# Patient Record
Sex: FEMALE | Race: WHITE | Employment: UNEMPLOYED | URBAN - METROPOLITAN AREA
[De-identification: names, ages, dates, MRNs, and addresses within clinical notes are randomized per-mention and may not be internally consistent; named-entity substitution may affect disease eponyms.]

---

## 2017-04-04 ENCOUNTER — HOSPITAL ENCOUNTER (EMERGENCY)
Age: 27
Discharge: HOME | End: 2017-04-04
Attending: EMERGENCY MEDICINE
Payer: COMMERCIAL

## 2017-04-04 VITALS
HEIGHT: 60 IN | DIASTOLIC BLOOD PRESSURE: 70 MMHG | RESPIRATION RATE: 20 BRPM | BODY MASS INDEX: 29.45 KG/M2 | WEIGHT: 150 LBS | OXYGEN SATURATION: 97 % | TEMPERATURE: 97.9 F | SYSTOLIC BLOOD PRESSURE: 125 MMHG | HEART RATE: 89 BPM

## 2017-04-04 DIAGNOSIS — J40 BRONCHITIS: Primary | ICD-10-CM

## 2017-04-04 DIAGNOSIS — K04.7 DENTAL INFECTION: ICD-10-CM

## 2017-04-04 LAB — HCG(URINE) PREGNANCY TEST: NEGATIVE

## 2017-04-04 PROCEDURE — 84703 CHORIONIC GONADOTROPIN ASSAY: CPT

## 2017-04-04 PROCEDURE — 99283 EMERGENCY DEPT VISIT LOW MDM: CPT

## 2017-04-04 RX ORDER — IBUPROFEN 200 MG
1600 TABLET ORAL EVERY 6 HOURS PRN
Status: ON HOLD | COMMUNITY
End: 2018-01-28 | Stop reason: HOSPADM

## 2017-04-04 RX ORDER — HYDROCODONE BITARTRATE AND ACETAMINOPHEN 5; 325 MG/1; MG/1
1 TABLET ORAL EVERY 6 HOURS PRN
Qty: 16 TABLET | Refills: 0 | Status: ON HOLD | OUTPATIENT
Start: 2017-04-04 | End: 2018-01-28 | Stop reason: HOSPADM

## 2017-04-04 RX ORDER — CLINDAMYCIN HYDROCHLORIDE 300 MG/1
300 CAPSULE ORAL 3 TIMES DAILY
Qty: 30 CAPSULE | Refills: 0 | Status: SHIPPED | OUTPATIENT
Start: 2017-04-04 | End: 2017-04-14

## 2017-04-04 RX ORDER — PREDNISONE 10 MG/1
TABLET ORAL
Qty: 18 TABLET | Refills: 0 | Status: SHIPPED | OUTPATIENT
Start: 2017-04-04 | End: 2017-04-14

## 2017-04-04 RX ORDER — GUAIFENESIN 100 MG/5ML
200 SYRUP ORAL 3 TIMES DAILY PRN
Status: ON HOLD | COMMUNITY
End: 2018-01-28 | Stop reason: HOSPADM

## 2017-04-04 RX ORDER — ALBUTEROL SULFATE 90 UG/1
AEROSOL, METERED RESPIRATORY (INHALATION)
Qty: 1 INHALER | Refills: 1 | Status: SHIPPED | OUTPATIENT
Start: 2017-04-04

## 2017-04-04 ASSESSMENT — PAIN DESCRIPTION - INTENSITY
RATING_2: 8
RATING_3: 8

## 2017-04-04 ASSESSMENT — ENCOUNTER SYMPTOMS
VOMITING: 0
SINUS PRESSURE: 1
ABDOMINAL PAIN: 0
RHINORRHEA: 1
FACIAL SWELLING: 1
NAUSEA: 0
SHORTNESS OF BREATH: 1
WHEEZING: 1
STRIDOR: 0
EYES NEGATIVE: 1
COUGH: 1
TROUBLE SWALLOWING: 0
ALLERGIC/IMMUNOLOGIC NEGATIVE: 1

## 2017-04-04 ASSESSMENT — PAIN DESCRIPTION - ONSET: ONSET: AWAKENED FROM SLEEP

## 2017-04-04 ASSESSMENT — PAIN DESCRIPTION - ORIENTATION: ORIENTATION_3: LOWER;RIGHT

## 2017-04-04 ASSESSMENT — PAIN DESCRIPTION - DESCRIPTORS
DESCRIPTORS: BURNING
DESCRIPTORS_2: BURNING
DESCRIPTORS_3: ACHING;SORE;SQUEEZING

## 2017-04-04 ASSESSMENT — PAIN DESCRIPTION - LOCATION
LOCATION_3: TEETH
LOCATION_2: THROAT
LOCATION: CHEST

## 2017-04-04 ASSESSMENT — PAIN DESCRIPTION - PAIN TYPE: TYPE: ACUTE PAIN

## 2017-04-04 ASSESSMENT — PAIN SCALES - GENERAL: PAINLEVEL_OUTOF10: 6

## 2017-12-14 PROBLEM — K91.2 POSTSURGICAL NONABSORPTION: Status: ACTIVE | Noted: 2017-12-14

## 2017-12-14 PROBLEM — D50.8 IRON DEFICIENCY ANEMIA SECONDARY TO INADEQUATE DIETARY IRON INTAKE: Status: ACTIVE | Noted: 2017-12-14

## 2018-01-25 PROBLEM — O34.219 PREVIOUS CESAREAN SECTION COMPLICATING PREGNANCY: Status: ACTIVE | Noted: 2018-01-25

## 2018-01-28 PROBLEM — Z98.891 S/P CESAREAN SECTION: Status: ACTIVE | Noted: 2018-01-28

## 2018-04-21 ENCOUNTER — HOSPITAL ENCOUNTER (EMERGENCY)
Dept: HOSPITAL 100 - ED | Age: 28
Discharge: HOME | End: 2018-04-21
Payer: MEDICAID

## 2018-04-21 VITALS — RESPIRATION RATE: 18 BRPM | OXYGEN SATURATION: 98 %

## 2018-04-21 VITALS
DIASTOLIC BLOOD PRESSURE: 71 MMHG | OXYGEN SATURATION: 98 % | HEART RATE: 115 BPM | TEMPERATURE: 98.42 F | RESPIRATION RATE: 16 BRPM | SYSTOLIC BLOOD PRESSURE: 125 MMHG

## 2018-04-21 VITALS — BODY MASS INDEX: 32.2 KG/M2

## 2018-04-21 DIAGNOSIS — R59.0: ICD-10-CM

## 2018-04-21 DIAGNOSIS — J02.9: Primary | ICD-10-CM

## 2018-04-21 PROCEDURE — 99283 EMERGENCY DEPT VISIT LOW MDM: CPT

## 2019-03-07 ENCOUNTER — HOSPITAL ENCOUNTER (EMERGENCY)
Age: 29
Discharge: HOME | End: 2019-03-07
Payer: MEDICAID

## 2019-03-07 VITALS
TEMPERATURE: 99.1 F | OXYGEN SATURATION: 98 % | SYSTOLIC BLOOD PRESSURE: 112 MMHG | DIASTOLIC BLOOD PRESSURE: 70 MMHG | RESPIRATION RATE: 18 BRPM | HEART RATE: 98 BPM

## 2019-03-07 VITALS — BODY MASS INDEX: 33.9 KG/M2

## 2019-03-07 DIAGNOSIS — L03.116: ICD-10-CM

## 2019-03-07 DIAGNOSIS — L02.02: Primary | ICD-10-CM

## 2019-03-07 DIAGNOSIS — Z72.0: ICD-10-CM

## 2019-03-07 PROCEDURE — 99283 EMERGENCY DEPT VISIT LOW MDM: CPT

## 2019-03-09 ENCOUNTER — HOSPITAL ENCOUNTER (EMERGENCY)
Age: 29
Discharge: HOME | End: 2019-03-09
Payer: MEDICAID

## 2019-03-09 VITALS
HEART RATE: 103 BPM | TEMPERATURE: 97.5 F | OXYGEN SATURATION: 99 % | RESPIRATION RATE: 15 BRPM | DIASTOLIC BLOOD PRESSURE: 80 MMHG | SYSTOLIC BLOOD PRESSURE: 131 MMHG

## 2019-03-09 VITALS — BODY MASS INDEX: 33.9 KG/M2 | BODY MASS INDEX: 34.4 KG/M2

## 2019-03-09 DIAGNOSIS — L03.116: ICD-10-CM

## 2019-03-09 DIAGNOSIS — Z72.0: ICD-10-CM

## 2019-03-09 DIAGNOSIS — Z79.2: ICD-10-CM

## 2019-03-09 DIAGNOSIS — L02.416: Primary | ICD-10-CM

## 2019-03-09 DIAGNOSIS — L02.01: ICD-10-CM

## 2019-03-09 PROCEDURE — 99282 EMERGENCY DEPT VISIT SF MDM: CPT

## 2019-03-09 PROCEDURE — 10060 I&D ABSCESS SIMPLE/SINGLE: CPT

## 2019-10-05 ENCOUNTER — HOSPITAL ENCOUNTER (INPATIENT)
Dept: HOSPITAL 100 - ED | Age: 29
LOS: 5 days | Discharge: HOME | DRG: 383 | End: 2019-10-10
Payer: MEDICAID

## 2019-10-05 VITALS
TEMPERATURE: 98.42 F | HEART RATE: 88 BPM | SYSTOLIC BLOOD PRESSURE: 104 MMHG | RESPIRATION RATE: 16 BRPM | DIASTOLIC BLOOD PRESSURE: 72 MMHG | OXYGEN SATURATION: 92 %

## 2019-10-05 VITALS
SYSTOLIC BLOOD PRESSURE: 110 MMHG | DIASTOLIC BLOOD PRESSURE: 75 MMHG | OXYGEN SATURATION: 100 % | TEMPERATURE: 97.8 F | HEART RATE: 80 BPM | RESPIRATION RATE: 18 BRPM

## 2019-10-05 VITALS
RESPIRATION RATE: 20 BRPM | HEART RATE: 100 BPM | TEMPERATURE: 99 F | DIASTOLIC BLOOD PRESSURE: 77 MMHG | OXYGEN SATURATION: 100 % | SYSTOLIC BLOOD PRESSURE: 131 MMHG

## 2019-10-05 VITALS
RESPIRATION RATE: 16 BRPM | OXYGEN SATURATION: 98 % | TEMPERATURE: 98.42 F | SYSTOLIC BLOOD PRESSURE: 95 MMHG | HEART RATE: 88 BPM | DIASTOLIC BLOOD PRESSURE: 64 MMHG

## 2019-10-05 VITALS — BODY MASS INDEX: 32.2 KG/M2 | BODY MASS INDEX: 34.4 KG/M2 | BODY MASS INDEX: 33.3 KG/M2

## 2019-10-05 DIAGNOSIS — B19.20: ICD-10-CM

## 2019-10-05 DIAGNOSIS — F15.10: ICD-10-CM

## 2019-10-05 DIAGNOSIS — L02.611: Primary | ICD-10-CM

## 2019-10-05 DIAGNOSIS — L03.115: ICD-10-CM

## 2019-10-05 DIAGNOSIS — F17.210: ICD-10-CM

## 2019-10-05 LAB
AMPHET UR-MCNC: POSITIVE NG/ML
ANION GAP: 6 (ref 5–15)
B-HCG SERPL QL: NEGATIVE NEGATIVE
BARBITURATE URINE VISTA: NEGATIVE
BENZODIAZEPINE URINE VISTA: NEGATIVE
BUN SERPL-MCNC: 13 MG/DL (ref 7–18)
BUN/CREAT RATIO: 23.7 RATIO (ref 10–20)
CALCIUM SERPL-MCNC: 8.3 MG/DL (ref 8.5–10.1)
CARBON DIOXIDE: 28 MMOL/L (ref 21–32)
CHLORIDE: 107 MMOL/L (ref 98–107)
COCAINE URINE VISTA: NEGATIVE
DEPRECATED RDW RBC: 41.9 FL (ref 35.1–43.9)
DRUG CONFIRMATION TO FOLLOW?: (no result)
ECSTACY URINE VISTA: POSITIVE
ERYTHROCYTE [DISTWIDTH] IN BLOOD: 13 % (ref 11.6–14.6)
EST GLOM FILT RATE - AFR AMER: 169 ML/MIN (ref 60–?)
ESTIMATED CREATININE CLEARANCE: 109.38 ML/MIN
GLUCOSE: 88 MG/DL (ref 74–106)
HCG SERPL QL: NEGATIVE NEGATIVE
HCT VFR BLD AUTO: 33.6 % (ref 37–47)
HEMOGLOBIN: 10.9 G/DL (ref 12–15)
HGB BLD-MCNC: 10.9 G/DL (ref 12–15)
IMMATURE GRANULOCYTES COUNT: 0.06 X10^3/UL (ref 0–0)
INTERNAL QC VALIDATED?: (no result)
MCV RBC: 88.9 FL (ref 81–99)
MEAN CORP HGB CONC: 32.4 G/DL (ref 32–36)
MEAN PLATELET VOL.: 9.8 FL (ref 6.2–12)
METHADONE URINE VISTA: NEGATIVE
NRBC FLAGGED BY ANALYZER: 0 % (ref 0–5)
PCP UR QL: NEGATIVE
PH UR: 6 [PH]
PLATELET # BLD: 218 K/MM3 (ref 150–450)
PLATELET COUNT: 218 K/MM3 (ref 150–450)
POTASSIUM: 3.3 MMOL/L (ref 3.5–5.1)
PROT UR QL STRIP.AUTO: 15 MG/DL
RBC # BLD AUTO: 3.78 M/MM3 (ref 4.2–5.4)
RBC DISTRIBUTION WIDTH CV: 13 % (ref 11.6–14.6)
RBC DISTRIBUTION WIDTH SD: 41.9 FL (ref 35.1–43.9)
RBC UR QL: 250 /UL
SP GR UR: 1.01 (ref 1–1.03)
THC URINE VISTA: NEGATIVE
URATE SERPL-MCNC: 2.5 MG/DL (ref 2.6–6)
URINE PRESERVATIVE: (no result)
WBC # BLD AUTO: 12.8 K/MM3 (ref 4.4–11)
WHITE BLOOD COUNT: 12.8 K/MM3 (ref 4.4–11)

## 2019-10-05 PROCEDURE — 73718 MRI LOWER EXTREMITY W/O DYE: CPT

## 2019-10-05 PROCEDURE — 99406 BEHAV CHNG SMOKING 3-10 MIN: CPT

## 2019-10-05 PROCEDURE — 84703 CHORIONIC GONADOTROPIN ASSAY: CPT

## 2019-10-05 PROCEDURE — 97162 PT EVAL MOD COMPLEX 30 MIN: CPT

## 2019-10-05 PROCEDURE — 80048 BASIC METABOLIC PNL TOTAL CA: CPT

## 2019-10-05 PROCEDURE — 73630 X-RAY EXAM OF FOOT: CPT

## 2019-10-05 PROCEDURE — 84550 ASSAY OF BLOOD/URIC ACID: CPT

## 2019-10-05 PROCEDURE — 87070 CULTURE OTHR SPECIMN AEROBIC: CPT

## 2019-10-05 PROCEDURE — 87075 CULTR BACTERIA EXCEPT BLOOD: CPT

## 2019-10-05 PROCEDURE — 87340 HEPATITIS B SURFACE AG IA: CPT

## 2019-10-05 PROCEDURE — 99285 EMERGENCY DEPT VISIT HI MDM: CPT

## 2019-10-05 PROCEDURE — 85025 COMPLETE CBC W/AUTO DIFF WBC: CPT

## 2019-10-05 PROCEDURE — A4216 STERILE WATER/SALINE, 10 ML: HCPCS

## 2019-10-05 PROCEDURE — 86704 HEP B CORE ANTIBODY TOTAL: CPT

## 2019-10-05 PROCEDURE — 87077 CULTURE AEROBIC IDENTIFY: CPT

## 2019-10-05 PROCEDURE — 86703 HIV-1/HIV-2 1 RESULT ANTBDY: CPT

## 2019-10-05 PROCEDURE — 80307 DRUG TEST PRSMV CHEM ANLYZR: CPT

## 2019-10-05 PROCEDURE — 80202 ASSAY OF VANCOMYCIN: CPT

## 2019-10-05 PROCEDURE — 81002 URINALYSIS NONAUTO W/O SCOPE: CPT

## 2019-10-05 PROCEDURE — 36415 COLL VENOUS BLD VENIPUNCTURE: CPT

## 2019-10-05 PROCEDURE — 87205 SMEAR GRAM STAIN: CPT

## 2019-10-05 PROCEDURE — 87640 STAPH A DNA AMP PROBE: CPT

## 2019-10-05 PROCEDURE — 87522 HEPATITIS C REVRS TRNSCRPJ: CPT

## 2019-10-05 PROCEDURE — 83605 ASSAY OF LACTIC ACID: CPT

## 2019-10-05 PROCEDURE — 83735 ASSAY OF MAGNESIUM: CPT

## 2019-10-05 PROCEDURE — 87186 SC STD MICRODIL/AGAR DIL: CPT

## 2019-10-05 RX ADMIN — CEFAZOLIN SODIUM 150 GM: 1 INJECTION, SOLUTION INTRAVENOUS at 22:01

## 2019-10-05 RX ADMIN — SODIUM CHLORIDE 1000 ML: 9 INJECTION, SOLUTION INTRAVENOUS at 10:22

## 2019-10-05 RX ADMIN — CEFAZOLIN SODIUM 150 GM: 1 INJECTION, SOLUTION INTRAVENOUS at 13:59

## 2019-10-05 RX ADMIN — SODIUM CHLORIDE 15 ML: 9 INJECTION, SOLUTION INTRAVENOUS at 14:00

## 2019-10-06 VITALS
SYSTOLIC BLOOD PRESSURE: 121 MMHG | DIASTOLIC BLOOD PRESSURE: 76 MMHG | OXYGEN SATURATION: 100 % | RESPIRATION RATE: 18 BRPM | TEMPERATURE: 98.24 F | HEART RATE: 82 BPM

## 2019-10-06 VITALS
SYSTOLIC BLOOD PRESSURE: 122 MMHG | RESPIRATION RATE: 16 BRPM | HEART RATE: 89 BPM | DIASTOLIC BLOOD PRESSURE: 75 MMHG | TEMPERATURE: 98.5 F | OXYGEN SATURATION: 100 %

## 2019-10-06 VITALS
OXYGEN SATURATION: 99 % | HEART RATE: 81 BPM | DIASTOLIC BLOOD PRESSURE: 62 MMHG | TEMPERATURE: 98.06 F | RESPIRATION RATE: 18 BRPM | SYSTOLIC BLOOD PRESSURE: 108 MMHG

## 2019-10-06 VITALS
DIASTOLIC BLOOD PRESSURE: 69 MMHG | SYSTOLIC BLOOD PRESSURE: 110 MMHG | TEMPERATURE: 97.52 F | HEART RATE: 85 BPM | OXYGEN SATURATION: 100 % | RESPIRATION RATE: 18 BRPM

## 2019-10-06 LAB
ANION GAP: 7 (ref 5–15)
BUN SERPL-MCNC: 9 MG/DL (ref 7–18)
BUN/CREAT RATIO: 19.6 RATIO (ref 10–20)
CALCIUM SERPL-MCNC: 8.1 MG/DL (ref 8.5–10.1)
CARBON DIOXIDE: 26 MMOL/L (ref 21–32)
CHLORIDE: 110 MMOL/L (ref 98–107)
DEPRECATED RDW RBC: 39.8 FL (ref 35.1–43.9)
DEPRECATED RDW RBC: 40.9 FL (ref 35.1–43.9)
ERYTHROCYTE [DISTWIDTH] IN BLOOD: 12.4 % (ref 11.6–14.6)
ERYTHROCYTE [DISTWIDTH] IN BLOOD: 12.8 % (ref 11.6–14.6)
EST GLOM FILT RATE - AFR AMER: 208 ML/MIN (ref 60–?)
ESTIMATED CREATININE CLEARANCE: 130.79 ML/MIN
GLUCOSE: 89 MG/DL (ref 74–106)
HCT VFR BLD AUTO: 32 % (ref 37–47)
HCT VFR BLD AUTO: 34.1 % (ref 37–47)
HEMOGLOBIN: 10.3 G/DL (ref 12–15)
HEMOGLOBIN: 10.9 G/DL (ref 12–15)
HGB BLD-MCNC: 10.3 G/DL (ref 12–15)
HGB BLD-MCNC: 10.9 G/DL (ref 12–15)
IMMATURE GRANULOCYTES COUNT: 0.06 X10^3/UL (ref 0–0)
IMMATURE GRANULOCYTES COUNT: 0.5 X10^3/UL (ref 0–0)
MCV RBC: 86.7 FL (ref 81–99)
MCV RBC: 87.9 FL (ref 81–99)
MEAN CORP HGB CONC: 32 G/DL (ref 32–36)
MEAN CORP HGB CONC: 32.2 G/DL (ref 32–36)
MEAN PLATELET VOL.: 10 FL (ref 6.2–12)
MEAN PLATELET VOL.: 10.8 FL (ref 6.2–12)
NRBC FLAGGED BY ANALYZER: 0 % (ref 0–5)
NRBC FLAGGED BY ANALYZER: 0 % (ref 0–5)
PLATELET # BLD: 230 K/MM3 (ref 150–450)
PLATELET # BLD: 267 K/MM3 (ref 150–450)
PLATELET COUNT: 230 K/MM3 (ref 150–450)
PLATELET COUNT: 267 K/MM3 (ref 150–450)
POTASSIUM: 3.7 MMOL/L (ref 3.5–5.1)
RBC # BLD AUTO: 3.69 M/MM3 (ref 4.2–5.4)
RBC # BLD AUTO: 3.88 M/MM3 (ref 4.2–5.4)
RBC DISTRIBUTION WIDTH CV: 12.4 % (ref 11.6–14.6)
RBC DISTRIBUTION WIDTH CV: 12.8 % (ref 11.6–14.6)
RBC DISTRIBUTION WIDTH SD: 39.8 FL (ref 35.1–43.9)
RBC DISTRIBUTION WIDTH SD: 40.9 FL (ref 35.1–43.9)
WBC # BLD AUTO: 10.4 K/MM3 (ref 4.4–11)
WBC # BLD AUTO: 9.7 K/MM3 (ref 4.4–11)
WHITE BLOOD COUNT: 10.4 K/MM3 (ref 4.4–11)
WHITE BLOOD COUNT: 9.7 K/MM3 (ref 4.4–11)

## 2019-10-06 RX ADMIN — CEFAZOLIN SODIUM 150 GM: 1 INJECTION, SOLUTION INTRAVENOUS at 13:43

## 2019-10-06 RX ADMIN — SODIUM CHLORIDE, PRESERVATIVE FREE 0 ML: 5 INJECTION INTRAVENOUS at 17:21

## 2019-10-06 RX ADMIN — CEFAZOLIN SODIUM 150 GM: 1 INJECTION, SOLUTION INTRAVENOUS at 06:37

## 2019-10-06 RX ADMIN — SODIUM CHLORIDE, PRESERVATIVE FREE 0 ML: 5 INJECTION INTRAVENOUS at 06:37

## 2019-10-07 VITALS
HEART RATE: 75 BPM | SYSTOLIC BLOOD PRESSURE: 122 MMHG | RESPIRATION RATE: 18 BRPM | OXYGEN SATURATION: 100 % | TEMPERATURE: 98 F | DIASTOLIC BLOOD PRESSURE: 79 MMHG

## 2019-10-07 VITALS
DIASTOLIC BLOOD PRESSURE: 74 MMHG | RESPIRATION RATE: 16 BRPM | TEMPERATURE: 98.06 F | HEART RATE: 89 BPM | SYSTOLIC BLOOD PRESSURE: 125 MMHG | OXYGEN SATURATION: 100 %

## 2019-10-07 VITALS
RESPIRATION RATE: 16 BRPM | DIASTOLIC BLOOD PRESSURE: 67 MMHG | TEMPERATURE: 98.6 F | SYSTOLIC BLOOD PRESSURE: 127 MMHG | HEART RATE: 77 BPM | OXYGEN SATURATION: 96 %

## 2019-10-07 VITALS
SYSTOLIC BLOOD PRESSURE: 112 MMHG | TEMPERATURE: 97.8 F | DIASTOLIC BLOOD PRESSURE: 69 MMHG | OXYGEN SATURATION: 99 % | RESPIRATION RATE: 16 BRPM | HEART RATE: 87 BPM

## 2019-10-07 RX ADMIN — SODIUM CHLORIDE, PRESERVATIVE FREE 0 ML: 5 INJECTION INTRAVENOUS at 21:26

## 2019-10-07 RX ADMIN — CEFAZOLIN SODIUM 100 GM: 1 INJECTION, SOLUTION INTRAVENOUS at 09:17

## 2019-10-07 RX ADMIN — SODIUM CHLORIDE, PRESERVATIVE FREE 0 ML: 5 INJECTION INTRAVENOUS at 09:17

## 2019-10-07 RX ADMIN — SODIUM CHLORIDE, PRESERVATIVE FREE 0 ML: 5 INJECTION INTRAVENOUS at 14:54

## 2019-10-07 RX ADMIN — SODIUM CHLORIDE, PRESERVATIVE FREE 0 ML: 5 INJECTION INTRAVENOUS at 06:00

## 2019-10-07 RX ADMIN — CEFAZOLIN SODIUM 100 GM: 1 INJECTION, SOLUTION INTRAVENOUS at 21:25

## 2019-10-07 RX ADMIN — SODIUM CHLORIDE, PRESERVATIVE FREE 0 ML: 5 INJECTION INTRAVENOUS at 23:00

## 2019-10-07 RX ADMIN — SODIUM CHLORIDE, PRESERVATIVE FREE 0 ML: 5 INJECTION INTRAVENOUS at 00:06

## 2019-10-07 RX ADMIN — CEFAZOLIN SODIUM 100 GM: 1 INJECTION, SOLUTION INTRAVENOUS at 14:54

## 2019-10-08 VITALS
OXYGEN SATURATION: 100 % | SYSTOLIC BLOOD PRESSURE: 122 MMHG | TEMPERATURE: 97.8 F | HEART RATE: 83 BPM | RESPIRATION RATE: 18 BRPM | DIASTOLIC BLOOD PRESSURE: 87 MMHG

## 2019-10-08 VITALS
SYSTOLIC BLOOD PRESSURE: 128 MMHG | OXYGEN SATURATION: 100 % | RESPIRATION RATE: 18 BRPM | HEART RATE: 77 BPM | DIASTOLIC BLOOD PRESSURE: 75 MMHG

## 2019-10-08 VITALS
TEMPERATURE: 97.16 F | OXYGEN SATURATION: 100 % | RESPIRATION RATE: 18 BRPM | SYSTOLIC BLOOD PRESSURE: 117 MMHG | HEART RATE: 78 BPM | DIASTOLIC BLOOD PRESSURE: 75 MMHG

## 2019-10-08 VITALS
OXYGEN SATURATION: 95 % | TEMPERATURE: 97.9 F | DIASTOLIC BLOOD PRESSURE: 88 MMHG | HEART RATE: 75 BPM | SYSTOLIC BLOOD PRESSURE: 103 MMHG | RESPIRATION RATE: 18 BRPM

## 2019-10-08 VITALS
SYSTOLIC BLOOD PRESSURE: 118 MMHG | DIASTOLIC BLOOD PRESSURE: 78 MMHG | TEMPERATURE: 97.5 F | HEART RATE: 82 BPM | RESPIRATION RATE: 16 BRPM | OXYGEN SATURATION: 99 %

## 2019-10-08 VITALS
SYSTOLIC BLOOD PRESSURE: 111 MMHG | RESPIRATION RATE: 16 BRPM | DIASTOLIC BLOOD PRESSURE: 77 MMHG | TEMPERATURE: 97.8 F | HEART RATE: 76 BPM | OXYGEN SATURATION: 97 %

## 2019-10-08 VITALS
HEART RATE: 71 BPM | DIASTOLIC BLOOD PRESSURE: 83 MMHG | TEMPERATURE: 97.7 F | OXYGEN SATURATION: 100 % | SYSTOLIC BLOOD PRESSURE: 122 MMHG | RESPIRATION RATE: 18 BRPM

## 2019-10-08 VITALS
DIASTOLIC BLOOD PRESSURE: 88 MMHG | SYSTOLIC BLOOD PRESSURE: 128 MMHG | TEMPERATURE: 97.52 F | RESPIRATION RATE: 16 BRPM | HEART RATE: 90 BPM | OXYGEN SATURATION: 100 %

## 2019-10-08 VITALS
HEART RATE: 77 BPM | DIASTOLIC BLOOD PRESSURE: 61 MMHG | SYSTOLIC BLOOD PRESSURE: 124 MMHG | RESPIRATION RATE: 16 BRPM | TEMPERATURE: 99 F | OXYGEN SATURATION: 99 %

## 2019-10-08 LAB
ANION GAP: 1 (ref 5–15)
BUN SERPL-MCNC: 15 MG/DL (ref 7–18)
BUN/CREAT RATIO: 24.3 RATIO (ref 10–20)
CALCIUM SERPL-MCNC: 8.3 MG/DL (ref 8.5–10.1)
CARBON DIOXIDE: 30 MMOL/L (ref 21–32)
CHLORIDE: 110 MMOL/L (ref 98–107)
DEPRECATED RDW RBC: 39.6 FL (ref 35.1–43.9)
ERYTHROCYTE [DISTWIDTH] IN BLOOD: 12.3 % (ref 11.6–14.6)
EST GLOM FILT RATE - AFR AMER: 147 ML/MIN (ref 60–?)
ESTIMATED CREATININE CLEARANCE: 97.03 ML/MIN
GLUCOSE: 92 MG/DL (ref 74–106)
HCT VFR BLD AUTO: 33.8 % (ref 37–47)
HEMOGLOBIN: 10.9 G/DL (ref 12–15)
HGB BLD-MCNC: 10.9 G/DL (ref 12–15)
IMMATURE GRANULOCYTES COUNT: 0.1 X10^3/UL (ref 0–0)
MAGNESIUM: 2 MG/DL (ref 1.6–2.6)
MCV RBC: 87.8 FL (ref 81–99)
MEAN CORP HGB CONC: 32.2 G/DL (ref 32–36)
MEAN PLATELET VOL.: 9.4 FL (ref 6.2–12)
NRBC FLAGGED BY ANALYZER: 0 % (ref 0–5)
PLATELET # BLD: 300 K/MM3 (ref 150–450)
PLATELET COUNT: 300 K/MM3 (ref 150–450)
POTASSIUM: 4.2 MMOL/L (ref 3.5–5.1)
RBC # BLD AUTO: 3.85 M/MM3 (ref 4.2–5.4)
RBC DISTRIBUTION WIDTH CV: 12.3 % (ref 11.6–14.6)
RBC DISTRIBUTION WIDTH SD: 39.6 FL (ref 35.1–43.9)
STAPH AUREUS DNA BY PCR: POSITIVE
WBC # BLD AUTO: 12.6 K/MM3 (ref 4.4–11)
WHITE BLOOD COUNT: 12.6 K/MM3 (ref 4.4–11)

## 2019-10-08 PROCEDURE — 0H9NXZZ DRAINAGE OF LEFT FOOT SKIN, EXTERNAL APPROACH: ICD-10-PCS | Performed by: PODIATRIST

## 2019-10-08 RX ADMIN — CEFAZOLIN SODIUM 100 GM: 1 INJECTION, SOLUTION INTRAVENOUS at 20:32

## 2019-10-08 RX ADMIN — CEFAZOLIN SODIUM 100 GM: 1 INJECTION, SOLUTION INTRAVENOUS at 14:20

## 2019-10-08 RX ADMIN — CEFAZOLIN SODIUM 100 GM: 1 INJECTION, SOLUTION INTRAVENOUS at 05:20

## 2019-10-08 RX ADMIN — SODIUM CHLORIDE, PRESERVATIVE FREE 0 ML: 5 INJECTION INTRAVENOUS at 05:20

## 2019-10-09 VITALS
TEMPERATURE: 98 F | DIASTOLIC BLOOD PRESSURE: 70 MMHG | HEART RATE: 75 BPM | SYSTOLIC BLOOD PRESSURE: 118 MMHG | RESPIRATION RATE: 16 BRPM | OXYGEN SATURATION: 98 %

## 2019-10-09 VITALS
HEART RATE: 68 BPM | DIASTOLIC BLOOD PRESSURE: 70 MMHG | OXYGEN SATURATION: 99 % | RESPIRATION RATE: 16 BRPM | SYSTOLIC BLOOD PRESSURE: 112 MMHG | TEMPERATURE: 97.7 F

## 2019-10-09 VITALS
RESPIRATION RATE: 18 BRPM | HEART RATE: 72 BPM | OXYGEN SATURATION: 98 % | TEMPERATURE: 98.6 F | DIASTOLIC BLOOD PRESSURE: 69 MMHG | SYSTOLIC BLOOD PRESSURE: 118 MMHG

## 2019-10-09 VITALS — HEART RATE: 70 BPM

## 2019-10-09 LAB — VANCOMYCIN TROUGH SERPL-MCNC: 7.4 UG/ML (ref 5–15)

## 2019-10-09 RX ADMIN — SODIUM CHLORIDE, PRESERVATIVE FREE 0 ML: 5 INJECTION INTRAVENOUS at 09:58

## 2019-10-09 RX ADMIN — SODIUM CHLORIDE, PRESERVATIVE FREE 0 ML: 5 INJECTION INTRAVENOUS at 20:13

## 2019-10-09 RX ADMIN — SODIUM CHLORIDE, PRESERVATIVE FREE 0 ML: 5 INJECTION INTRAVENOUS at 11:41

## 2019-10-09 RX ADMIN — SODIUM CHLORIDE, PRESERVATIVE FREE 0 ML: 5 INJECTION INTRAVENOUS at 18:28

## 2019-10-09 RX ADMIN — SODIUM CHLORIDE, PRESERVATIVE FREE 0 ML: 5 INJECTION INTRAVENOUS at 11:40

## 2019-10-09 RX ADMIN — SODIUM CHLORIDE, PRESERVATIVE FREE 0 ML: 5 INJECTION INTRAVENOUS at 23:26

## 2019-10-09 RX ADMIN — CEFAZOLIN SODIUM 100 GM: 1 INJECTION, SOLUTION INTRAVENOUS at 05:24

## 2019-10-10 VITALS
TEMPERATURE: 98.6 F | HEART RATE: 66 BPM | OXYGEN SATURATION: 99 % | SYSTOLIC BLOOD PRESSURE: 124 MMHG | DIASTOLIC BLOOD PRESSURE: 84 MMHG | RESPIRATION RATE: 16 BRPM

## 2019-10-10 VITALS
HEART RATE: 72 BPM | OXYGEN SATURATION: 95 % | RESPIRATION RATE: 18 BRPM | SYSTOLIC BLOOD PRESSURE: 111 MMHG | DIASTOLIC BLOOD PRESSURE: 69 MMHG | TEMPERATURE: 98.6 F

## 2019-10-10 VITALS
TEMPERATURE: 98.96 F | DIASTOLIC BLOOD PRESSURE: 54 MMHG | OXYGEN SATURATION: 99 % | SYSTOLIC BLOOD PRESSURE: 103 MMHG | HEART RATE: 67 BPM | RESPIRATION RATE: 16 BRPM

## 2019-10-10 LAB
HCV QUANT. RNA PCR: (no result) IU/ML
HCV RNA SERPL NAA+PROBE-ACNC: (no result) IU/ML

## 2019-10-10 RX ADMIN — SULFAMETHOXAZOLE AND TRIMETHOPRIM 1 TABLET: 800; 160 TABLET ORAL at 11:03

## 2019-10-10 RX ADMIN — SODIUM CHLORIDE, PRESERVATIVE FREE 0 ML: 5 INJECTION INTRAVENOUS at 00:03

## 2019-11-23 ENCOUNTER — HOSPITAL ENCOUNTER (EMERGENCY)
Age: 29
Discharge: HOME | End: 2019-11-23
Payer: MEDICAID

## 2019-11-23 VITALS — RESPIRATION RATE: 16 BRPM

## 2019-11-23 VITALS — BODY MASS INDEX: 33.3 KG/M2 | BODY MASS INDEX: 34.3 KG/M2

## 2019-11-23 VITALS
HEART RATE: 78 BPM | SYSTOLIC BLOOD PRESSURE: 138 MMHG | DIASTOLIC BLOOD PRESSURE: 84 MMHG | RESPIRATION RATE: 18 BRPM | TEMPERATURE: 98.42 F | OXYGEN SATURATION: 98 %

## 2019-11-23 DIAGNOSIS — F17.200: ICD-10-CM

## 2019-11-23 DIAGNOSIS — S02.2XXA: Primary | ICD-10-CM
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
## 2019-11-23 PROCEDURE — 96372 THER/PROPH/DIAG INJ SC/IM: CPT

## 2019-11-23 PROCEDURE — 70486 CT MAXILLOFACIAL W/O DYE: CPT

## 2019-11-23 PROCEDURE — 99285 EMERGENCY DEPT VISIT HI MDM: CPT

## 2019-12-17 ENCOUNTER — HOSPITAL ENCOUNTER (OUTPATIENT)
Dept: HOSPITAL 100 - ED | Age: 29
Setting detail: OBSERVATION
LOS: 2 days | Discharge: ANOTHER HOSPITAL | DRG: 283 | End: 2019-12-19
Payer: MEDICAID

## 2019-12-17 VITALS
HEART RATE: 85 BPM | DIASTOLIC BLOOD PRESSURE: 72 MMHG | OXYGEN SATURATION: 98 % | RESPIRATION RATE: 14 BRPM | SYSTOLIC BLOOD PRESSURE: 109 MMHG

## 2019-12-17 VITALS
SYSTOLIC BLOOD PRESSURE: 128 MMHG | DIASTOLIC BLOOD PRESSURE: 84 MMHG | RESPIRATION RATE: 20 BRPM | HEART RATE: 107 BPM | TEMPERATURE: 98 F | OXYGEN SATURATION: 97 %

## 2019-12-17 VITALS — BODY MASS INDEX: 33.5 KG/M2 | BODY MASS INDEX: 34.3 KG/M2 | BODY MASS INDEX: 31.2 KG/M2 | BODY MASS INDEX: 33.4 KG/M2

## 2019-12-17 VITALS
SYSTOLIC BLOOD PRESSURE: 123 MMHG | RESPIRATION RATE: 16 BRPM | HEART RATE: 85 BPM | OXYGEN SATURATION: 96 % | DIASTOLIC BLOOD PRESSURE: 73 MMHG | TEMPERATURE: 98.42 F

## 2019-12-17 VITALS
HEART RATE: 86 BPM | SYSTOLIC BLOOD PRESSURE: 121 MMHG | TEMPERATURE: 97.88 F | OXYGEN SATURATION: 97 % | DIASTOLIC BLOOD PRESSURE: 84 MMHG | RESPIRATION RATE: 16 BRPM

## 2019-12-17 DIAGNOSIS — F11.11: ICD-10-CM

## 2019-12-17 DIAGNOSIS — B15.9: Primary | ICD-10-CM

## 2019-12-17 DIAGNOSIS — F15.10: ICD-10-CM

## 2019-12-17 DIAGNOSIS — Z86.14: ICD-10-CM

## 2019-12-17 DIAGNOSIS — R35.0: ICD-10-CM

## 2019-12-17 DIAGNOSIS — B16.9: ICD-10-CM

## 2019-12-17 DIAGNOSIS — F17.210: ICD-10-CM

## 2019-12-17 DIAGNOSIS — R31.9: ICD-10-CM

## 2019-12-17 LAB
ACETAMINOPHEN (TYLENOL) LEVEL: < 2 UG/ML (ref 10–30)
ALANINE AMINOTRANSFER ALT/SGPT: 1226 U/L (ref 13–56)
ALBUMIN SERPL-MCNC: 3.1 G/DL (ref 3.2–5)
ALBUMIN SERPL-MCNC: 3.4 G/DL (ref 3.2–5)
ALKALINE PHOSPHATASE: 184 U/L (ref 45–117)
AMPHET UR-MCNC: POSITIVE NG/ML
ANION GAP: 5 (ref 5–15)
AST(SGOT): 1260 U/L (ref 15–37)
BARBITURATE URINE VISTA: NEGATIVE
BENZODIAZEPINE URINE VISTA: NEGATIVE
BILIRUB DIRECT SERPL-MCNC: 1.68 MG/DL (ref 0–0.3)
BILIRUB UR QL STRIP: 3 MG/DL
BUN SERPL-MCNC: 11 MG/DL (ref 7–18)
BUN/CREAT RATIO: 15.6 RATIO (ref 10–20)
CALCIUM SERPL-MCNC: 7.9 MG/DL (ref 8.5–10.1)
CARBON DIOXIDE: 27 MMOL/L (ref 21–32)
CHLORIDE: 104 MMOL/L (ref 98–107)
COCAINE URINE VISTA: NEGATIVE
DEPRECATED RDW RBC: 42.1 FL (ref 35.1–43.9)
DIFFERENTIAL COMMENT: (no result)
DIFFERENTIAL INDICATED: (no result)
DRUG CONFIRMATION TO FOLLOW?: (no result)
ECSTACY URINE VISTA: NEGATIVE
ERYTHROCYTE [DISTWIDTH] IN BLOOD: 13.6 % (ref 11.6–14.6)
EST GLOM FILT RATE - AFR AMER: 126 ML/MIN (ref 60–?)
ESTIMATED CREATININE CLEARANCE: 85.18 ML/MIN
GLOBULIN: 3.9 G/DL (ref 2.2–4.2)
GLUCOSE: 99 MG/DL (ref 74–106)
HCT VFR BLD AUTO: 38.8 % (ref 37–47)
HEMOGLOBIN: 12.6 G/DL (ref 12–15)
HGB BLD-MCNC: 12.6 G/DL (ref 12–15)
IMMATURE GRANULOCYTES COUNT: 0.02 X10^3/UL (ref 0–0)
INTERNAL QC VALIDATED?: (no result)
LIPASE: 115 U/L (ref 73–393)
MANUAL DIF COMMENT BLD-IMP: (no result)
MCV RBC: 84.3 FL (ref 81–99)
MEAN CORP HGB CONC: 32.5 G/DL (ref 32–36)
MEAN PLATELET VOL.: 9.9 FL (ref 6.2–12)
METHADONE URINE VISTA: NEGATIVE
MUCOUS THREADS URNS QL MICRO: (no result) /HPF
NRBC FLAGGED BY ANALYZER: 0 % (ref 0–5)
PCP UR QL: NEGATIVE
PH UR: 6 [PH]
PLATELET # BLD: 170 K/MM3 (ref 150–450)
PLATELET COUNT: 170 K/MM3 (ref 150–450)
POSITIVE MORPHOLOGY: YES
POTASSIUM: 3.7 MMOL/L (ref 3.5–5.1)
PROT UR QL STRIP.AUTO: 15 MG/DL
RBC # BLD AUTO: 4.6 M/MM3 (ref 4.2–5.4)
RBC DISTRIBUTION WIDTH CV: 13.6 % (ref 11.6–14.6)
RBC DISTRIBUTION WIDTH SD: 42.1 FL (ref 35.1–43.9)
RBC UR QL: (no result) /HPF (ref 0–5)
RBC UR QL: 250 /UL
SCAN SMEAR PER REVIEW CRITERIA: (no result)
SP GR UR: 1.02 (ref 1–1.03)
SQUAMOUS URNS QL MICRO: (no result) /HPF (ref 5–10)
THC URINE VISTA: NEGATIVE
URINE PRESERVATIVE: (no result)
WBC # BLD AUTO: 3.5 K/MM3 (ref 4.4–11)
WHITE BLOOD COUNT: 3.5 K/MM3 (ref 4.4–11)

## 2019-12-17 PROCEDURE — 85025 COMPLETE CBC W/AUTO DIFF WBC: CPT

## 2019-12-17 PROCEDURE — 76705 ECHO EXAM OF ABDOMEN: CPT

## 2019-12-17 PROCEDURE — A4216 STERILE WATER/SALINE, 10 ML: HCPCS

## 2019-12-17 PROCEDURE — 86664 EPSTEIN-BARR NUCLEAR ANTIGEN: CPT

## 2019-12-17 PROCEDURE — 80329 ANALGESICS NON-OPIOID 1 OR 2: CPT

## 2019-12-17 PROCEDURE — 80307 DRUG TEST PRSMV CHEM ANLYZR: CPT

## 2019-12-17 PROCEDURE — 99284 EMERGENCY DEPT VISIT MOD MDM: CPT

## 2019-12-17 PROCEDURE — 83690 ASSAY OF LIPASE: CPT

## 2019-12-17 PROCEDURE — 80074 ACUTE HEPATITIS PANEL: CPT

## 2019-12-17 PROCEDURE — 82040 ASSAY OF SERUM ALBUMIN: CPT

## 2019-12-17 PROCEDURE — 86663 EPSTEIN-BARR ANTIBODY: CPT

## 2019-12-17 PROCEDURE — 86308 HETEROPHILE ANTIBODY SCREEN: CPT

## 2019-12-17 PROCEDURE — 82248 BILIRUBIN DIRECT: CPT

## 2019-12-17 PROCEDURE — 83735 ASSAY OF MAGNESIUM: CPT

## 2019-12-17 PROCEDURE — 71045 X-RAY EXAM CHEST 1 VIEW: CPT

## 2019-12-17 PROCEDURE — 87633 RESP VIRUS 12-25 TARGETS: CPT

## 2019-12-17 PROCEDURE — 87350 HEPATITIS BE AG IA: CPT

## 2019-12-17 PROCEDURE — G0480 DRUG TEST DEF 1-7 CLASSES: HCPCS

## 2019-12-17 PROCEDURE — 86665 EPSTEIN-BARR CAPSID VCA: CPT

## 2019-12-17 PROCEDURE — 36415 COLL VENOUS BLD VENIPUNCTURE: CPT

## 2019-12-17 PROCEDURE — 87040 BLOOD CULTURE FOR BACTERIA: CPT

## 2019-12-17 PROCEDURE — 87186 SC STD MICRODIL/AGAR DIL: CPT

## 2019-12-17 PROCEDURE — 80076 HEPATIC FUNCTION PANEL: CPT

## 2019-12-17 PROCEDURE — G0378 HOSPITAL OBSERVATION PER HR: HCPCS

## 2019-12-17 PROCEDURE — 87088 URINE BACTERIA CULTURE: CPT

## 2019-12-17 PROCEDURE — 99218: CPT

## 2019-12-17 PROCEDURE — 80048 BASIC METABOLIC PNL TOTAL CA: CPT

## 2019-12-17 PROCEDURE — 99406 BEHAV CHNG SMOKING 3-10 MIN: CPT

## 2019-12-17 PROCEDURE — 81001 URINALYSIS AUTO W/SCOPE: CPT

## 2019-12-17 PROCEDURE — 80053 COMPREHEN METABOLIC PANEL: CPT

## 2019-12-17 PROCEDURE — 87086 URINE CULTURE/COLONY COUNT: CPT

## 2019-12-17 RX ADMIN — SODIUM CHLORIDE 100 ML: 9 INJECTION, SOLUTION INTRAVENOUS at 15:00

## 2019-12-17 RX ADMIN — SODIUM CHLORIDE, PRESERVATIVE FREE 0 ML: 5 INJECTION INTRAVENOUS at 15:00

## 2019-12-17 RX ADMIN — SODIUM CHLORIDE 1000 ML: 9 INJECTION, SOLUTION INTRAVENOUS at 11:58

## 2019-12-17 RX ADMIN — PROMETHAZINE HYDROCHLORIDE 12.5 MG: 25 INJECTION INTRAMUSCULAR; INTRAVENOUS at 15:07

## 2019-12-17 RX ADMIN — SODIUM CHLORIDE 100 ML: 9 INJECTION, SOLUTION INTRAVENOUS at 23:55

## 2019-12-17 RX ADMIN — SODIUM CHLORIDE, PRESERVATIVE FREE 0 ML: 5 INJECTION INTRAVENOUS at 15:07

## 2019-12-17 RX ADMIN — PROMETHAZINE HYDROCHLORIDE 12.5 MG: 25 INJECTION INTRAMUSCULAR; INTRAVENOUS at 23:53

## 2019-12-18 VITALS
OXYGEN SATURATION: 96 % | SYSTOLIC BLOOD PRESSURE: 120 MMHG | TEMPERATURE: 98.06 F | DIASTOLIC BLOOD PRESSURE: 82 MMHG | RESPIRATION RATE: 16 BRPM | HEART RATE: 83 BPM

## 2019-12-18 VITALS
OXYGEN SATURATION: 97 % | HEART RATE: 64 BPM | SYSTOLIC BLOOD PRESSURE: 115 MMHG | RESPIRATION RATE: 16 BRPM | DIASTOLIC BLOOD PRESSURE: 61 MMHG | TEMPERATURE: 97.9 F

## 2019-12-18 VITALS
TEMPERATURE: 98.42 F | SYSTOLIC BLOOD PRESSURE: 106 MMHG | HEART RATE: 86 BPM | DIASTOLIC BLOOD PRESSURE: 59 MMHG | OXYGEN SATURATION: 98 % | RESPIRATION RATE: 16 BRPM

## 2019-12-18 VITALS
OXYGEN SATURATION: 97 % | TEMPERATURE: 97.88 F | DIASTOLIC BLOOD PRESSURE: 71 MMHG | SYSTOLIC BLOOD PRESSURE: 113 MMHG | HEART RATE: 82 BPM | RESPIRATION RATE: 16 BRPM

## 2019-12-18 LAB
ALANINE AMINOTRANSFER ALT/SGPT: 1266 U/L (ref 13–56)
ALANINE AMINOTRANSFER ALT/SGPT: 1381 U/L (ref 13–56)
ALBUMIN SERPL-MCNC: 2.8 G/DL (ref 3.2–5)
ALBUMIN SERPL-MCNC: 2.9 G/DL (ref 3.2–5)
ALKALINE PHOSPHATASE: 162 U/L (ref 45–117)
ALKALINE PHOSPHATASE: 164 U/L (ref 45–117)
ANION GAP: 6 (ref 5–15)
AST(SGOT): 1224 U/L (ref 15–37)
AST(SGOT): 1253 U/L (ref 15–37)
BILIRUB DIRECT SERPL-MCNC: 1.72 MG/DL (ref 0–0.3)
BUN SERPL-MCNC: 5 MG/DL (ref 7–18)
BUN/CREAT RATIO: 8.4 RATIO (ref 10–20)
CALCIUM SERPL-MCNC: 7.4 MG/DL (ref 8.5–10.1)
CARBON DIOXIDE: 23 MMOL/L (ref 21–32)
CHLORIDE: 111 MMOL/L (ref 98–107)
DEPRECATED RDW RBC: 42.2 FL (ref 35.1–43.9)
DIFFERENTIAL COMMENT: (no result)
DIFFERENTIAL INDICATED: (no result)
ERYTHROCYTE [DISTWIDTH] IN BLOOD: 13.7 % (ref 11.6–14.6)
EST GLOM FILT RATE - AFR AMER: 153 ML/MIN (ref 60–?)
ESTIMATED CREATININE CLEARANCE: 99.37 ML/MIN
GLOBULIN: 3.4 G/DL (ref 2.2–4.2)
GLOBULIN: 3.5 G/DL (ref 2.2–4.2)
GLUCOSE: 101 MG/DL (ref 74–106)
HCT VFR BLD AUTO: 36 % (ref 37–47)
HEMOGLOBIN: 11.7 G/DL (ref 12–15)
HGB BLD-MCNC: 11.7 G/DL (ref 12–15)
IMMATURE GRANULOCYTES COUNT: 0.02 X10^3/UL (ref 0–0)
MAGNESIUM: 1.9 MG/DL (ref 1.6–2.6)
MANUAL DIF COMMENT BLD-IMP: (no result)
MCV RBC: 84.1 FL (ref 81–99)
MEAN CORP HGB CONC: 32.5 G/DL (ref 32–36)
MEAN PLATELET VOL.: 10.1 FL (ref 6.2–12)
NRBC FLAGGED BY ANALYZER: 0 % (ref 0–5)
PLATELET # BLD: 147 K/MM3 (ref 150–450)
PLATELET COUNT: 147 K/MM3 (ref 150–450)
POSITIVE MORPHOLOGY: YES
POTASSIUM: 3.6 MMOL/L (ref 3.5–5.1)
RBC # BLD AUTO: 4.28 M/MM3 (ref 4.2–5.4)
RBC DISTRIBUTION WIDTH CV: 13.7 % (ref 11.6–14.6)
RBC DISTRIBUTION WIDTH SD: 42.2 FL (ref 35.1–43.9)
REACTIVE LYMPHOCYTE: (no result)
SCAN SMEAR PER REVIEW CRITERIA: (no result)
WBC # BLD AUTO: 3.4 K/MM3 (ref 4.4–11)
WHITE BLOOD COUNT: 3.4 K/MM3 (ref 4.4–11)

## 2019-12-18 RX ADMIN — SODIUM CHLORIDE 125 ML: 9 INJECTION, SOLUTION INTRAVENOUS at 17:40

## 2019-12-18 RX ADMIN — SODIUM CHLORIDE, PRESERVATIVE FREE 0 ML: 5 INJECTION INTRAVENOUS at 17:40

## 2019-12-19 VITALS
OXYGEN SATURATION: 98 % | TEMPERATURE: 98.06 F | DIASTOLIC BLOOD PRESSURE: 71 MMHG | RESPIRATION RATE: 18 BRPM | HEART RATE: 68 BPM | SYSTOLIC BLOOD PRESSURE: 121 MMHG

## 2019-12-19 VITALS
DIASTOLIC BLOOD PRESSURE: 72 MMHG | HEART RATE: 68 BPM | RESPIRATION RATE: 16 BRPM | TEMPERATURE: 98.06 F | OXYGEN SATURATION: 98 % | SYSTOLIC BLOOD PRESSURE: 116 MMHG

## 2019-12-19 VITALS
TEMPERATURE: 98.2 F | HEART RATE: 75 BPM | RESPIRATION RATE: 18 BRPM | SYSTOLIC BLOOD PRESSURE: 106 MMHG | DIASTOLIC BLOOD PRESSURE: 71 MMHG | OXYGEN SATURATION: 97 %

## 2019-12-19 PROBLEM — R74.02 ELEVATED LEVELS OF TRANSAMINASE & LACTIC ACID DEHYDROGENASE: Status: ACTIVE | Noted: 2019-12-19

## 2019-12-19 PROBLEM — R74.01 ELEVATED TRANSAMINASE LEVEL: Status: ACTIVE | Noted: 2019-12-19

## 2019-12-19 PROBLEM — R74.01 ELEVATED LEVELS OF TRANSAMINASE & LACTIC ACID DEHYDROGENASE: Status: ACTIVE | Noted: 2019-12-19

## 2019-12-19 LAB
ALANINE AMINOTRANSFER ALT/SGPT: 1209 U/L (ref 13–56)
ALANINE AMINOTRANSFER ALT/SGPT: 1243 U/L (ref 13–56)
ALBUMIN SERPL-MCNC: 2.8 G/DL (ref 3.2–5)
ALBUMIN SERPL-MCNC: 2.8 G/DL (ref 3.2–5)
ALKALINE PHOSPHATASE: 155 U/L (ref 45–117)
ALKALINE PHOSPHATASE: 165 U/L (ref 45–117)
ANION GAP: 5 (ref 5–15)
AST(SGOT): 744 U/L (ref 15–37)
AST(SGOT): 891 U/L (ref 15–37)
BILIRUB DIRECT SERPL-MCNC: 1.92 MG/DL (ref 0–0.3)
BILIRUB DIRECT SERPL-MCNC: 2.11 MG/DL (ref 0–0.3)
BUN SERPL-MCNC: 6 MG/DL (ref 7–18)
BUN/CREAT RATIO: 12.8 RATIO (ref 10–20)
CALCIUM SERPL-MCNC: 7.6 MG/DL (ref 8.5–10.1)
CARBON DIOXIDE: 25 MMOL/L (ref 21–32)
CHLORIDE: 111 MMOL/L (ref 98–107)
DEPRECATED RDW RBC: 42.2 FL (ref 35.1–43.9)
DIFFERENTIAL COMMENT: (no result)
DIFFERENTIAL INDICATED: (no result)
ERYTHROCYTE [DISTWIDTH] IN BLOOD: 13.6 % (ref 11.6–14.6)
EST GLOM FILT RATE - AFR AMER: 202 ML/MIN (ref 60–?)
ESTIMATED CREATININE CLEARANCE: 126.86 ML/MIN
GLOBULIN: 3.5 G/DL (ref 2.2–4.2)
GLOBULIN: 3.8 G/DL (ref 2.2–4.2)
GLUCOSE: 88 MG/DL (ref 74–106)
HCT VFR BLD AUTO: 34.7 % (ref 37–47)
HEMOGLOBIN: 11.3 G/DL (ref 12–15)
HEP C ANTIBODIES: >11 S/CO RATIO (ref 0–0.9)
HGB BLD-MCNC: 11.3 G/DL (ref 12–15)
IMMATURE GRANULOCYTES COUNT: 0.01 X10^3/UL (ref 0–0)
MANUAL DIF COMMENT BLD-IMP: (no result)
MCV RBC: 84.4 FL (ref 81–99)
MEAN CORP HGB CONC: 32.6 G/DL (ref 32–36)
MEAN PLATELET VOL.: 10.3 FL (ref 6.2–12)
NRBC FLAGGED BY ANALYZER: 0 % (ref 0–5)
PLATELET # BLD: 153 K/MM3 (ref 150–450)
PLATELET COUNT: 153 K/MM3 (ref 150–450)
POSITIVE MORPHOLOGY: YES
POTASSIUM: 3.8 MMOL/L (ref 3.5–5.1)
RBC # BLD AUTO: 4.11 M/MM3 (ref 4.2–5.4)
RBC DISTRIBUTION WIDTH CV: 13.6 % (ref 11.6–14.6)
RBC DISTRIBUTION WIDTH SD: 42.2 FL (ref 35.1–43.9)
REACTIVE LYMPHOCYTE: (no result)
SCAN SMEAR PER REVIEW CRITERIA: (no result)
WBC # BLD AUTO: 4.1 K/MM3 (ref 4.4–11)
WHITE BLOOD COUNT: 4.1 K/MM3 (ref 4.4–11)

## 2019-12-19 RX ADMIN — SODIUM CHLORIDE, PRESERVATIVE FREE 0 ML: 5 INJECTION INTRAVENOUS at 15:54

## 2019-12-19 RX ADMIN — SODIUM CHLORIDE 125 ML: 9 INJECTION, SOLUTION INTRAVENOUS at 01:49

## 2019-12-20 LAB
EBV ACUTE VCA IGM: < 36 U/ML (ref 0–35.9)
EBV VCA IGM SER-ACNC: < 36 U/ML (ref 0–35.9)
EBV-VCA IGG: > 600 U/ML (ref 0–17.9)

## 2020-01-21 ENCOUNTER — HOSPITAL ENCOUNTER (EMERGENCY)
Dept: HOSPITAL 100 - ED | Age: 30
Discharge: HOME | End: 2020-01-21
Payer: MEDICAID

## 2020-01-21 VITALS
OXYGEN SATURATION: 97 % | HEART RATE: 83 BPM | SYSTOLIC BLOOD PRESSURE: 108 MMHG | RESPIRATION RATE: 13 BRPM | TEMPERATURE: 97.6 F | DIASTOLIC BLOOD PRESSURE: 61 MMHG

## 2020-01-21 VITALS
RESPIRATION RATE: 14 BRPM | OXYGEN SATURATION: 96 % | HEART RATE: 90 BPM | DIASTOLIC BLOOD PRESSURE: 68 MMHG | SYSTOLIC BLOOD PRESSURE: 104 MMHG | TEMPERATURE: 98.2 F

## 2020-01-21 VITALS
RESPIRATION RATE: 14 BRPM | HEART RATE: 81 BPM | TEMPERATURE: 97.88 F | SYSTOLIC BLOOD PRESSURE: 106 MMHG | DIASTOLIC BLOOD PRESSURE: 74 MMHG | OXYGEN SATURATION: 97 %

## 2020-01-21 VITALS
HEART RATE: 79 BPM | DIASTOLIC BLOOD PRESSURE: 71 MMHG | RESPIRATION RATE: 14 BRPM | SYSTOLIC BLOOD PRESSURE: 111 MMHG | OXYGEN SATURATION: 97 %

## 2020-01-21 VITALS
RESPIRATION RATE: 16 BRPM | TEMPERATURE: 99.14 F | SYSTOLIC BLOOD PRESSURE: 100 MMHG | DIASTOLIC BLOOD PRESSURE: 65 MMHG | HEART RATE: 115 BPM | OXYGEN SATURATION: 98 %

## 2020-01-21 VITALS — BODY MASS INDEX: 33.4 KG/M2 | BODY MASS INDEX: 32.4 KG/M2

## 2020-01-21 VITALS — HEART RATE: 105 BPM

## 2020-01-21 DIAGNOSIS — N20.0: ICD-10-CM

## 2020-01-21 DIAGNOSIS — N39.0: Primary | ICD-10-CM

## 2020-01-21 DIAGNOSIS — F11.10: ICD-10-CM

## 2020-01-21 DIAGNOSIS — F15.10: ICD-10-CM

## 2020-01-21 DIAGNOSIS — R16.2: ICD-10-CM

## 2020-01-21 LAB
ALANINE AMINOTRANSFER ALT/SGPT: 19 U/L (ref 13–56)
ALBUMIN SERPL-MCNC: 2.7 G/DL (ref 3.2–5)
ALKALINE PHOSPHATASE: 77 U/L (ref 45–117)
AMPHET UR-MCNC: POSITIVE NG/ML
ANION GAP: 7 (ref 5–15)
ANISOCYTOSIS BLD QL SMEAR: (no result)
ANISOCYTOSIS: (no result)
AST(SGOT): 10 U/L (ref 15–37)
B-HCG SERPL QL: NEGATIVE NEGATIVE
BARBITURATE URINE VISTA: NEGATIVE
BENZODIAZEPINE URINE VISTA: NEGATIVE
BUN SERPL-MCNC: 11 MG/DL (ref 7–18)
BUN/CREAT RATIO: 12.2 RATIO (ref 10–20)
CALCIUM SERPL-MCNC: 8.2 MG/DL (ref 8.5–10.1)
CARBON DIOXIDE: 23 MMOL/L (ref 21–32)
CHLORIDE: 103 MMOL/L (ref 98–107)
COCAINE URINE VISTA: NEGATIVE
DEPRECATED RDW RBC: 41.2 FL (ref 35.1–43.9)
DIFFERENTIAL COMMENT: (no result)
DIFFERENTIAL INDICATED: (no result)
DRUG CONFIRMATION TO FOLLOW?: (no result)
ECSTACY URINE VISTA: NEGATIVE
ERYTHROCYTE [DISTWIDTH] IN BLOOD: 13.6 % (ref 11.6–14.6)
EST GLOM FILT RATE - AFR AMER: 95 ML/MIN (ref 60–?)
ESTIMATED CREATININE CLEARANCE: 66.25 ML/MIN
GLOBULIN: 4.9 G/DL (ref 2.2–4.2)
GLUCOSE: 111 MG/DL (ref 74–106)
HCG SERPL QL: NEGATIVE NEGATIVE
HCT VFR BLD AUTO: 34.1 % (ref 37–47)
HEMOGLOBIN: 11.2 G/DL (ref 12–15)
HGB BLD-MCNC: 11.2 G/DL (ref 12–15)
IMMATURE GRANULOCYTES COUNT: 0.1 X10^3/UL (ref 0–0)
INTERNAL QC VALIDATED?: (no result)
LEUKOCYTE ESTERASE UR QL STRIP: 500 /UL
LIPASE: 99 U/L (ref 73–393)
MANUAL DIF COMMENT BLD-IMP: (no result)
MCV RBC: 82.8 FL (ref 81–99)
MEAN CORP HGB CONC: 32.8 G/DL (ref 32–36)
MEAN PLATELET VOL.: 10.1 FL (ref 6.2–12)
METHADONE URINE VISTA: NEGATIVE
MUCOUS THREADS URNS QL MICRO: (no result) /HPF
NRBC FLAGGED BY ANALYZER: 0 % (ref 0–5)
PCP UR QL: NEGATIVE
PH UR: 6 [PH]
PLATELET # BLD: 238 K/MM3 (ref 150–450)
PLATELET COUNT: 238 K/MM3 (ref 150–450)
POSITIVE DIFFERENTIAL: YES
POTASSIUM: 3.2 MMOL/L (ref 3.5–5.1)
PROT UR QL STRIP.AUTO: 100 MG/DL
RBC # BLD AUTO: 4.12 M/MM3 (ref 4.2–5.4)
RBC DISTRIBUTION WIDTH CV: 13.6 % (ref 11.6–14.6)
RBC DISTRIBUTION WIDTH SD: 41.2 FL (ref 35.1–43.9)
RBC UR QL: (no result) /HPF (ref 0–5)
RBC UR QL: 250 /UL
SCAN SMEAR PER REVIEW CRITERIA: (no result)
SP GR UR: 1.01 (ref 1–1.03)
SQUAMOUS URNS QL MICRO: (no result) /HPF (ref 5–10)
THC URINE VISTA: NEGATIVE
URINE PRESERVATIVE: (no result)
WBC # BLD AUTO: 16.7 K/MM3 (ref 4.4–11)
WHITE BLOOD COUNT: 16.7 K/MM3 (ref 4.4–11)

## 2020-01-21 PROCEDURE — 83605 ASSAY OF LACTIC ACID: CPT

## 2020-01-21 PROCEDURE — 74176 CT ABD & PELVIS W/O CONTRAST: CPT

## 2020-01-21 PROCEDURE — 96374 THER/PROPH/DIAG INJ IV PUSH: CPT

## 2020-01-21 PROCEDURE — 87077 CULTURE AEROBIC IDENTIFY: CPT

## 2020-01-21 PROCEDURE — 96361 HYDRATE IV INFUSION ADD-ON: CPT

## 2020-01-21 PROCEDURE — 81001 URINALYSIS AUTO W/SCOPE: CPT

## 2020-01-21 PROCEDURE — 99285 EMERGENCY DEPT VISIT HI MDM: CPT

## 2020-01-21 PROCEDURE — A4216 STERILE WATER/SALINE, 10 ML: HCPCS

## 2020-01-21 PROCEDURE — 80053 COMPREHEN METABOLIC PANEL: CPT

## 2020-01-21 PROCEDURE — 87186 SC STD MICRODIL/AGAR DIL: CPT

## 2020-01-21 PROCEDURE — 85025 COMPLETE CBC W/AUTO DIFF WBC: CPT

## 2020-01-21 PROCEDURE — 83690 ASSAY OF LIPASE: CPT

## 2020-01-21 PROCEDURE — 80307 DRUG TEST PRSMV CHEM ANLYZR: CPT

## 2020-01-21 PROCEDURE — 36415 COLL VENOUS BLD VENIPUNCTURE: CPT

## 2020-01-21 PROCEDURE — 87086 URINE CULTURE/COLONY COUNT: CPT

## 2020-01-21 PROCEDURE — 96375 TX/PRO/DX INJ NEW DRUG ADDON: CPT

## 2020-01-21 PROCEDURE — 96365 THER/PROPH/DIAG IV INF INIT: CPT

## 2020-01-21 PROCEDURE — 87088 URINE BACTERIA CULTURE: CPT

## 2020-01-21 PROCEDURE — 87040 BLOOD CULTURE FOR BACTERIA: CPT

## 2020-01-21 PROCEDURE — 84703 CHORIONIC GONADOTROPIN ASSAY: CPT

## 2020-01-21 RX ADMIN — SODIUM CHLORIDE 1000 ML: 9 INJECTION, SOLUTION INTRAVENOUS at 12:54

## 2020-01-21 RX ADMIN — KETOROLAC TROMETHAMINE 30 MG: 30 INJECTION, SOLUTION INTRAMUSCULAR; INTRAVENOUS at 12:55

## 2020-01-21 RX ADMIN — SODIUM CHLORIDE 999 ML: 9 INJECTION, SOLUTION INTRAVENOUS at 14:37

## 2020-09-20 ENCOUNTER — HOSPITAL ENCOUNTER (EMERGENCY)
Dept: HOSPITAL 100 - ED | Age: 30
Discharge: HOME | End: 2020-09-20
Payer: MEDICAID

## 2020-09-20 VITALS — BODY MASS INDEX: 32.4 KG/M2 | BODY MASS INDEX: 27.8 KG/M2

## 2020-09-20 VITALS
SYSTOLIC BLOOD PRESSURE: 111 MMHG | TEMPERATURE: 98.78 F | DIASTOLIC BLOOD PRESSURE: 82 MMHG | OXYGEN SATURATION: 99 % | HEART RATE: 93 BPM | RESPIRATION RATE: 22 BRPM

## 2020-09-20 VITALS
HEART RATE: 101 BPM | OXYGEN SATURATION: 97 % | TEMPERATURE: 98.5 F | RESPIRATION RATE: 17 BRPM | SYSTOLIC BLOOD PRESSURE: 106 MMHG | DIASTOLIC BLOOD PRESSURE: 68 MMHG

## 2020-09-20 VITALS
DIASTOLIC BLOOD PRESSURE: 82 MMHG | SYSTOLIC BLOOD PRESSURE: 111 MMHG | TEMPERATURE: 98.78 F | OXYGEN SATURATION: 99 % | RESPIRATION RATE: 22 BRPM | HEART RATE: 93 BPM

## 2020-09-20 DIAGNOSIS — F17.200: ICD-10-CM

## 2020-09-20 DIAGNOSIS — J02.9: ICD-10-CM

## 2020-09-20 DIAGNOSIS — R41.0: ICD-10-CM

## 2020-09-20 DIAGNOSIS — R09.81: ICD-10-CM

## 2020-09-20 DIAGNOSIS — B34.9: Primary | ICD-10-CM

## 2020-09-20 DIAGNOSIS — Z86.19: ICD-10-CM

## 2020-09-20 DIAGNOSIS — R06.00: ICD-10-CM

## 2020-09-20 DIAGNOSIS — R07.89: ICD-10-CM

## 2020-09-20 DIAGNOSIS — R05: ICD-10-CM

## 2020-09-20 PROCEDURE — 71045 X-RAY EXAM CHEST 1 VIEW: CPT

## 2020-09-20 PROCEDURE — C9803 HOPD COVID-19 SPEC COLLECT: HCPCS

## 2020-09-20 PROCEDURE — 87635 SARS-COV-2 COVID-19 AMP PRB: CPT

## 2020-09-20 PROCEDURE — 99283 EMERGENCY DEPT VISIT LOW MDM: CPT

## 2020-09-20 PROCEDURE — U0003 INFECTIOUS AGENT DETECTION BY NUCLEIC ACID (DNA OR RNA); SEVERE ACUTE RESPIRATORY SYNDROME CORONAVIRUS 2 (SARS-COV-2) (CORONAVIRUS DISEASE [COVID-19]), AMPLIFIED PROBE TECHNIQUE, MAKING USE OF HIGH THROUGHPUT TECHNOLOGIES AS DESCRIBED BY CMS-2020-01-R: HCPCS

## 2020-09-28 ENCOUNTER — HOSPITAL ENCOUNTER (EMERGENCY)
Dept: HOSPITAL 100 - ED | Age: 30
Discharge: HOME | End: 2020-09-28
Payer: MEDICAID

## 2020-09-28 VITALS — BODY MASS INDEX: 29.3 KG/M2 | BODY MASS INDEX: 27.8 KG/M2

## 2020-09-28 VITALS
HEART RATE: 102 BPM | RESPIRATION RATE: 18 BRPM | DIASTOLIC BLOOD PRESSURE: 89 MMHG | TEMPERATURE: 97.16 F | SYSTOLIC BLOOD PRESSURE: 143 MMHG | OXYGEN SATURATION: 99 %
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
## 2020-09-28 DIAGNOSIS — F17.200: ICD-10-CM
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
## 2020-09-28 DIAGNOSIS — Z87.898: ICD-10-CM

## 2020-09-28 DIAGNOSIS — S00.83XA: Primary | ICD-10-CM

## 2020-09-28 DIAGNOSIS — K75.9: ICD-10-CM

## 2020-09-28 PROCEDURE — 99283 EMERGENCY DEPT VISIT LOW MDM: CPT

## 2020-09-28 PROCEDURE — 70486 CT MAXILLOFACIAL W/O DYE: CPT

## 2020-09-28 RX ADMIN — HYDROCODONE BITARTRATE AND ACETAMINOPHEN 1 TABLET: 5; 325 TABLET ORAL at 06:14

## 2020-11-05 ENCOUNTER — HOSPITAL ENCOUNTER (EMERGENCY)
Dept: HOSPITAL 100 - ED | Age: 30
LOS: 1 days | Discharge: HOME | End: 2020-11-06
Payer: MEDICAID

## 2020-11-05 VITALS
SYSTOLIC BLOOD PRESSURE: 112 MMHG | HEART RATE: 102 BPM | RESPIRATION RATE: 16 BRPM | OXYGEN SATURATION: 99 % | DIASTOLIC BLOOD PRESSURE: 81 MMHG

## 2020-11-05 VITALS
RESPIRATION RATE: 18 BRPM | OXYGEN SATURATION: 100 % | SYSTOLIC BLOOD PRESSURE: 119 MMHG | TEMPERATURE: 97.1 F | HEART RATE: 100 BPM | DIASTOLIC BLOOD PRESSURE: 88 MMHG

## 2020-11-05 VITALS — BODY MASS INDEX: 29.3 KG/M2 | BODY MASS INDEX: 31.6 KG/M2

## 2020-11-05 DIAGNOSIS — R51.9: ICD-10-CM

## 2020-11-05 DIAGNOSIS — E34.8: ICD-10-CM
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
## 2020-11-05 DIAGNOSIS — T40.1X1A: Primary | ICD-10-CM
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
## 2020-11-05 PROCEDURE — 70450 CT HEAD/BRAIN W/O DYE: CPT

## 2020-11-05 PROCEDURE — 99285 EMERGENCY DEPT VISIT HI MDM: CPT

## 2020-11-06 VITALS
HEART RATE: 78 BPM | OXYGEN SATURATION: 99 % | RESPIRATION RATE: 18 BRPM | DIASTOLIC BLOOD PRESSURE: 87 MMHG | SYSTOLIC BLOOD PRESSURE: 127 MMHG

## 2020-12-10 ENCOUNTER — HOSPITAL ENCOUNTER (EMERGENCY)
Age: 30
Discharge: HOME | End: 2020-12-10
Payer: MEDICAID

## 2020-12-10 VITALS — BODY MASS INDEX: 31.6 KG/M2 | BODY MASS INDEX: 30.8 KG/M2

## 2020-12-10 VITALS — RESPIRATION RATE: 16 BRPM

## 2020-12-10 VITALS
HEART RATE: 101 BPM | DIASTOLIC BLOOD PRESSURE: 75 MMHG | SYSTOLIC BLOOD PRESSURE: 123 MMHG | TEMPERATURE: 98.6 F | RESPIRATION RATE: 18 BRPM | OXYGEN SATURATION: 99 %

## 2020-12-10 VITALS
TEMPERATURE: 98.6 F | OXYGEN SATURATION: 99 % | SYSTOLIC BLOOD PRESSURE: 123 MMHG | DIASTOLIC BLOOD PRESSURE: 75 MMHG | HEART RATE: 104 BPM | RESPIRATION RATE: 18 BRPM

## 2020-12-10 DIAGNOSIS — N39.0: Primary | ICD-10-CM

## 2020-12-10 DIAGNOSIS — Z72.0: ICD-10-CM

## 2020-12-10 LAB
INTERNAL QC VALIDATED?: (no result)
LEUKOCYTE ESTERASE UR QL STRIP: 500 /UL
MUCOUS THREADS URNS QL MICRO: (no result) /HPF
PROT UR QL STRIP.AUTO: 100 MG/DL
RBC UR QL: (no result) /HPF (ref 0–5)
RBC UR QL: 25 /UL
SP GR UR: 1.01 (ref 1–1.03)
SQUAMOUS URNS QL MICRO: (no result) /HPF (ref 5–10)
URINE PRESERVATIVE: (no result)

## 2020-12-10 PROCEDURE — 81025 URINE PREGNANCY TEST: CPT

## 2020-12-10 PROCEDURE — 99283 EMERGENCY DEPT VISIT LOW MDM: CPT

## 2020-12-10 PROCEDURE — 81001 URINALYSIS AUTO W/SCOPE: CPT

## 2021-03-22 ENCOUNTER — HOSPITAL ENCOUNTER (INPATIENT)
Dept: HOSPITAL 100 - ED | Age: 31
LOS: 2 days | Discharge: LEFT BEFORE DISCHARGE | DRG: 816 | End: 2021-03-24
Payer: MEDICAID

## 2021-03-22 VITALS
HEART RATE: 92 BPM | OXYGEN SATURATION: 92 % | DIASTOLIC BLOOD PRESSURE: 79 MMHG | SYSTOLIC BLOOD PRESSURE: 109 MMHG | TEMPERATURE: 97.7 F | RESPIRATION RATE: 21 BRPM

## 2021-03-22 VITALS
DIASTOLIC BLOOD PRESSURE: 100 MMHG | RESPIRATION RATE: 20 BRPM | TEMPERATURE: 97.52 F | SYSTOLIC BLOOD PRESSURE: 148 MMHG | HEART RATE: 112 BPM

## 2021-03-22 VITALS
OXYGEN SATURATION: 100 % | HEART RATE: 97 BPM | TEMPERATURE: 97.88 F | RESPIRATION RATE: 18 BRPM | SYSTOLIC BLOOD PRESSURE: 105 MMHG | DIASTOLIC BLOOD PRESSURE: 72 MMHG

## 2021-03-22 VITALS
SYSTOLIC BLOOD PRESSURE: 88 MMHG | HEART RATE: 95 BPM | OXYGEN SATURATION: 99 % | RESPIRATION RATE: 21 BRPM | DIASTOLIC BLOOD PRESSURE: 75 MMHG

## 2021-03-22 VITALS
OXYGEN SATURATION: 100 % | TEMPERATURE: 97.88 F | HEART RATE: 79 BPM | SYSTOLIC BLOOD PRESSURE: 102 MMHG | DIASTOLIC BLOOD PRESSURE: 68 MMHG | RESPIRATION RATE: 20 BRPM

## 2021-03-22 VITALS
SYSTOLIC BLOOD PRESSURE: 108 MMHG | RESPIRATION RATE: 22 BRPM | OXYGEN SATURATION: 90 % | HEART RATE: 95 BPM | DIASTOLIC BLOOD PRESSURE: 80 MMHG

## 2021-03-22 VITALS — HEART RATE: 98 BPM

## 2021-03-22 VITALS — HEART RATE: 91 BPM

## 2021-03-22 VITALS
SYSTOLIC BLOOD PRESSURE: 102 MMHG | HEART RATE: 83 BPM | DIASTOLIC BLOOD PRESSURE: 68 MMHG | RESPIRATION RATE: 24 BRPM | OXYGEN SATURATION: 100 %

## 2021-03-22 VITALS — OXYGEN SATURATION: 100 %

## 2021-03-22 VITALS
HEART RATE: 79 BPM | OXYGEN SATURATION: 95 % | RESPIRATION RATE: 18 BRPM | SYSTOLIC BLOOD PRESSURE: 102 MMHG | DIASTOLIC BLOOD PRESSURE: 71 MMHG

## 2021-03-22 VITALS
HEART RATE: 86 BPM | DIASTOLIC BLOOD PRESSURE: 71 MMHG | RESPIRATION RATE: 16 BRPM | SYSTOLIC BLOOD PRESSURE: 99 MMHG | OXYGEN SATURATION: 92 %

## 2021-03-22 VITALS — BODY MASS INDEX: 30.6 KG/M2 | BODY MASS INDEX: 30.8 KG/M2 | BODY MASS INDEX: 31.2 KG/M2

## 2021-03-22 VITALS
OXYGEN SATURATION: 96 % | TEMPERATURE: 97.9 F | RESPIRATION RATE: 14 BRPM | SYSTOLIC BLOOD PRESSURE: 105 MMHG | DIASTOLIC BLOOD PRESSURE: 72 MMHG | HEART RATE: 87 BPM

## 2021-03-22 VITALS
RESPIRATION RATE: 22 BRPM | SYSTOLIC BLOOD PRESSURE: 102 MMHG | DIASTOLIC BLOOD PRESSURE: 80 MMHG | OXYGEN SATURATION: 92 % | HEART RATE: 96 BPM

## 2021-03-22 VITALS
RESPIRATION RATE: 27 BRPM | SYSTOLIC BLOOD PRESSURE: 85 MMHG | OXYGEN SATURATION: 87 % | HEART RATE: 105 BPM | DIASTOLIC BLOOD PRESSURE: 59 MMHG

## 2021-03-22 VITALS — HEART RATE: 112 BPM | SYSTOLIC BLOOD PRESSURE: 148 MMHG | DIASTOLIC BLOOD PRESSURE: 122 MMHG | RESPIRATION RATE: 26 BRPM

## 2021-03-22 VITALS — HEART RATE: 88 BPM | RESPIRATION RATE: 19 BRPM | OXYGEN SATURATION: 94 %

## 2021-03-22 VITALS — OXYGEN SATURATION: 76 %

## 2021-03-22 VITALS — OXYGEN SATURATION: 94 %

## 2021-03-22 DIAGNOSIS — N17.9: ICD-10-CM

## 2021-03-22 DIAGNOSIS — I95.9: ICD-10-CM

## 2021-03-22 DIAGNOSIS — K21.9: ICD-10-CM

## 2021-03-22 DIAGNOSIS — B18.1: ICD-10-CM

## 2021-03-22 DIAGNOSIS — J96.01: ICD-10-CM

## 2021-03-22 DIAGNOSIS — Z86.19: ICD-10-CM
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
## 2021-03-22 DIAGNOSIS — F17.200: ICD-10-CM

## 2021-03-22 DIAGNOSIS — G93.41: ICD-10-CM

## 2021-03-22 DIAGNOSIS — J69.0: ICD-10-CM

## 2021-03-22 DIAGNOSIS — T40.1X1A: Primary | ICD-10-CM

## 2021-03-22 DIAGNOSIS — F11.23: ICD-10-CM

## 2021-03-22 DIAGNOSIS — B18.2: ICD-10-CM

## 2021-03-22 DIAGNOSIS — F15.10: ICD-10-CM

## 2021-03-22 DIAGNOSIS — Z87.2: ICD-10-CM

## 2021-03-22 LAB
ALCOHOL, BLOOD (MEDICAL)-SERUM: < 3 MG/DL
AMPHET UR-MCNC: POSITIVE NG/ML
AMYLASE SERPL-CCNC: 83 U/L (ref 25–115)
ANION GAP: 7 (ref 5–15)
B-HCG SERPL QL: NEGATIVE NEGATIVE
BARBITURATE URINE VISTA: NEGATIVE
BENZODIAZEPINE URINE VISTA: NEGATIVE
BUN SERPL-MCNC: 20 MG/DL (ref 7–18)
BUN/CREAT RATIO: 16.5 RATIO (ref 10–20)
CALCIUM SERPL-MCNC: 8.8 MG/DL (ref 8.5–10.1)
CARBON DIOXIDE: 26 MMOL/L (ref 21–32)
CHLORIDE: 106 MMOL/L (ref 98–107)
COCAINE URINE VISTA: NEGATIVE
DEPRECATED RDW RBC: 43 FL (ref 35.1–43.9)
DIFFERENTIAL COMMENT: (no result)
DIFFERENTIAL INDICATED: (no result)
DRUG CONFIRMATION TO FOLLOW?: (no result)
ECSTACY URINE VISTA: POSITIVE
ERYTHROCYTE [DISTWIDTH] IN BLOOD: 13.1 % (ref 11.6–14.6)
EST GLOM FILT RATE - AFR AMER: 67 ML/MIN (ref 60–?)
ESTIMATED CREATININE CLEARANCE: 48.83 ML/MIN
GLUCOSE: 240 MG/DL (ref 74–106)
HCG SERPL QL: NEGATIVE NEGATIVE
HCT VFR BLD AUTO: 41.6 % (ref 37–47)
HEMOGLOBIN: 12.7 G/DL (ref 12–15)
HGB BLD-MCNC: 12.7 G/DL (ref 12–15)
IMMATURE GRANULOCYTES COUNT: 0.1 X10^3/UL (ref 0–0)
INTERNAL QC VALIDATED?: (no result)
LEUKOCYTE ESTERASE UR QL STRIP: 100 /UL
MANUAL DIF COMMENT BLD-IMP: (no result)
MCV RBC: 89.8 FL (ref 81–99)
MEAN CORP HGB CONC: 30.5 G/DL (ref 32–36)
MEAN PLATELET VOL.: 10 FL (ref 6.2–12)
METHADONE URINE VISTA: NEGATIVE
MUCOUS THREADS URNS QL MICRO: (no result) /HPF
NRBC FLAGGED BY ANALYZER: 0 % (ref 0–5)
PCP UR QL: NEGATIVE
PH UR: 5 [PH]
PLATELET # BLD: 320 K/MM3 (ref 150–450)
PLATELET COUNT: 320 K/MM3 (ref 150–450)
POSITIVE MORPHOLOGY: YES
POTASSIUM: 3.7 MMOL/L (ref 3.5–5.1)
PROT UR QL STRIP.AUTO: 100 MG/DL
PROTHROMBIN TIME (PROTIME)PT.: 14.2 SECONDS (ref 11.7–14.9)
RBC # BLD AUTO: 4.63 M/MM3 (ref 4.2–5.4)
RBC DISTRIBUTION WIDTH CV: 13.1 % (ref 11.6–14.6)
RBC DISTRIBUTION WIDTH SD: 43 FL (ref 35.1–43.9)
RBC UR QL: (no result) /HPF (ref 0–5)
RBC UR QL: 25 /UL
REACTIVE LYMPHOCYTE: (no result)
SCAN SMEAR PER REVIEW CRITERIA: (no result)
SP GR UR: 1.02 (ref 1–1.03)
SQUAMOUS URNS QL MICRO: (no result) /HPF (ref 5–10)
THC URINE VISTA: NEGATIVE
URINE PRESERVATIVE: (no result)
WBC # BLD AUTO: 8.9 K/MM3 (ref 4.4–11)
WHITE BLOOD COUNT: 8.9 K/MM3 (ref 4.4–11)

## 2021-03-22 PROCEDURE — 93005 ELECTROCARDIOGRAM TRACING: CPT

## 2021-03-22 PROCEDURE — 80307 DRUG TEST PRSMV CHEM ANLYZR: CPT

## 2021-03-22 PROCEDURE — 71045 X-RAY EXAM CHEST 1 VIEW: CPT

## 2021-03-22 PROCEDURE — 80048 BASIC METABOLIC PNL TOTAL CA: CPT

## 2021-03-22 PROCEDURE — 87633 RESP VIRUS 12-25 TARGETS: CPT

## 2021-03-22 PROCEDURE — 80053 COMPREHEN METABOLIC PANEL: CPT

## 2021-03-22 PROCEDURE — 87088 URINE BACTERIA CULTURE: CPT

## 2021-03-22 PROCEDURE — 99285 EMERGENCY DEPT VISIT HI MDM: CPT

## 2021-03-22 PROCEDURE — 87040 BLOOD CULTURE FOR BACTERIA: CPT

## 2021-03-22 PROCEDURE — 85025 COMPLETE CBC W/AUTO DIFF WBC: CPT

## 2021-03-22 PROCEDURE — 84484 ASSAY OF TROPONIN QUANT: CPT

## 2021-03-22 PROCEDURE — 87426 SARSCOV CORONAVIRUS AG IA: CPT

## 2021-03-22 PROCEDURE — 87086 URINE CULTURE/COLONY COUNT: CPT

## 2021-03-22 PROCEDURE — A4216 STERILE WATER/SALINE, 10 ML: HCPCS

## 2021-03-22 PROCEDURE — 36415 COLL VENOUS BLD VENIPUNCTURE: CPT

## 2021-03-22 PROCEDURE — 80074 ACUTE HEPATITIS PANEL: CPT

## 2021-03-22 PROCEDURE — 85610 PROTHROMBIN TIME: CPT

## 2021-03-22 PROCEDURE — 82077 ASSAY SPEC XCP UR&BREATH IA: CPT

## 2021-03-22 PROCEDURE — 82150 ASSAY OF AMYLASE: CPT

## 2021-03-22 PROCEDURE — 81001 URINALYSIS AUTO W/SCOPE: CPT

## 2021-03-22 PROCEDURE — 80076 HEPATIC FUNCTION PANEL: CPT

## 2021-03-22 PROCEDURE — 87449 NOS EACH ORGANISM AG IA: CPT

## 2021-03-22 PROCEDURE — 83605 ASSAY OF LACTIC ACID: CPT

## 2021-03-22 PROCEDURE — 84703 CHORIONIC GONADOTROPIN ASSAY: CPT

## 2021-03-22 RX ADMIN — HYDROXYZINE PAMOATE 50 MG: 25 CAPSULE ORAL at 21:33

## 2021-03-22 RX ADMIN — SODIUM CHLORIDE 999 ML: 9 INJECTION, SOLUTION INTRAVENOUS at 12:44

## 2021-03-22 RX ADMIN — LEVOFLOXACIN 100 MG: 5 INJECTION, SOLUTION INTRAVENOUS at 16:41

## 2021-03-23 VITALS
RESPIRATION RATE: 15 BRPM | TEMPERATURE: 98.06 F | SYSTOLIC BLOOD PRESSURE: 91 MMHG | OXYGEN SATURATION: 100 % | DIASTOLIC BLOOD PRESSURE: 60 MMHG | HEART RATE: 90 BPM

## 2021-03-23 VITALS
RESPIRATION RATE: 21 BRPM | HEART RATE: 98 BPM | TEMPERATURE: 98.4 F | SYSTOLIC BLOOD PRESSURE: 112 MMHG | DIASTOLIC BLOOD PRESSURE: 76 MMHG | OXYGEN SATURATION: 96 %

## 2021-03-23 VITALS
OXYGEN SATURATION: 94 % | HEART RATE: 96 BPM | TEMPERATURE: 97.9 F | RESPIRATION RATE: 15 BRPM | SYSTOLIC BLOOD PRESSURE: 87 MMHG | DIASTOLIC BLOOD PRESSURE: 60 MMHG

## 2021-03-23 VITALS
HEART RATE: 87 BPM | DIASTOLIC BLOOD PRESSURE: 57 MMHG | SYSTOLIC BLOOD PRESSURE: 88 MMHG | OXYGEN SATURATION: 100 % | RESPIRATION RATE: 15 BRPM

## 2021-03-23 VITALS
DIASTOLIC BLOOD PRESSURE: 59 MMHG | TEMPERATURE: 97.88 F | HEART RATE: 94 BPM | RESPIRATION RATE: 16 BRPM | SYSTOLIC BLOOD PRESSURE: 93 MMHG | OXYGEN SATURATION: 93 %

## 2021-03-23 VITALS
DIASTOLIC BLOOD PRESSURE: 57 MMHG | OXYGEN SATURATION: 94 % | HEART RATE: 89 BPM | RESPIRATION RATE: 16 BRPM | SYSTOLIC BLOOD PRESSURE: 92 MMHG

## 2021-03-23 VITALS
OXYGEN SATURATION: 97 % | RESPIRATION RATE: 24 BRPM | HEART RATE: 101 BPM | SYSTOLIC BLOOD PRESSURE: 117 MMHG | TEMPERATURE: 98.4 F | DIASTOLIC BLOOD PRESSURE: 73 MMHG

## 2021-03-23 VITALS
DIASTOLIC BLOOD PRESSURE: 79 MMHG | OXYGEN SATURATION: 90 % | HEART RATE: 99 BPM | SYSTOLIC BLOOD PRESSURE: 102 MMHG | RESPIRATION RATE: 20 BRPM

## 2021-03-23 VITALS
SYSTOLIC BLOOD PRESSURE: 90 MMHG | HEART RATE: 89 BPM | RESPIRATION RATE: 15 BRPM | DIASTOLIC BLOOD PRESSURE: 58 MMHG | OXYGEN SATURATION: 93 %

## 2021-03-23 VITALS
SYSTOLIC BLOOD PRESSURE: 103 MMHG | OXYGEN SATURATION: 94 % | HEART RATE: 91 BPM | DIASTOLIC BLOOD PRESSURE: 64 MMHG | RESPIRATION RATE: 15 BRPM

## 2021-03-23 VITALS
OXYGEN SATURATION: 99 % | HEART RATE: 101 BPM | DIASTOLIC BLOOD PRESSURE: 81 MMHG | RESPIRATION RATE: 20 BRPM | SYSTOLIC BLOOD PRESSURE: 109 MMHG

## 2021-03-23 VITALS — OXYGEN SATURATION: 99 %

## 2021-03-23 VITALS
SYSTOLIC BLOOD PRESSURE: 96 MMHG | DIASTOLIC BLOOD PRESSURE: 62 MMHG | OXYGEN SATURATION: 98 % | HEART RATE: 87 BPM | RESPIRATION RATE: 17 BRPM

## 2021-03-23 VITALS — HEART RATE: 81 BPM

## 2021-03-23 VITALS — HEART RATE: 91 BPM

## 2021-03-23 LAB
ALANINE AMINOTRANSFER ALT/SGPT: 56 U/L (ref 13–56)
ALBUMIN SERPL-MCNC: 2.6 G/DL (ref 3.2–5)
ALKALINE PHOSPHATASE: 63 U/L (ref 45–117)
AST(SGOT): 30 U/L (ref 15–37)
BILIRUB DIRECT SERPL-MCNC: 0.16 MG/DL (ref 0–0.3)
GLOBULIN: 3.1 G/DL (ref 2.2–4.2)

## 2021-03-23 RX ADMIN — SENNOSIDES 2 TABLET: 8.6 TABLET, FILM COATED ORAL at 12:34

## 2021-03-23 RX ADMIN — LEVOFLOXACIN 100 MG: 5 INJECTION, SOLUTION INTRAVENOUS at 07:58

## 2021-03-24 VITALS
OXYGEN SATURATION: 94 % | SYSTOLIC BLOOD PRESSURE: 111 MMHG | RESPIRATION RATE: 18 BRPM | TEMPERATURE: 97.8 F | DIASTOLIC BLOOD PRESSURE: 61 MMHG | HEART RATE: 68 BPM

## 2021-03-24 VITALS
OXYGEN SATURATION: 95 % | RESPIRATION RATE: 18 BRPM | SYSTOLIC BLOOD PRESSURE: 100 MMHG | TEMPERATURE: 98 F | DIASTOLIC BLOOD PRESSURE: 53 MMHG | HEART RATE: 75 BPM

## 2021-03-24 VITALS
TEMPERATURE: 98.9 F | OXYGEN SATURATION: 97 % | HEART RATE: 72 BPM | RESPIRATION RATE: 18 BRPM | DIASTOLIC BLOOD PRESSURE: 60 MMHG | SYSTOLIC BLOOD PRESSURE: 101 MMHG

## 2021-03-24 VITALS
OXYGEN SATURATION: 94 % | HEART RATE: 80 BPM | TEMPERATURE: 98 F | RESPIRATION RATE: 16 BRPM | SYSTOLIC BLOOD PRESSURE: 97 MMHG | DIASTOLIC BLOOD PRESSURE: 55 MMHG

## 2021-03-24 VITALS — RESPIRATION RATE: 18 BRPM | OXYGEN SATURATION: 96 %

## 2021-03-24 VITALS — OXYGEN SATURATION: 95 %

## 2021-03-24 LAB
ALANINE AMINOTRANSFER ALT/SGPT: 44 U/L (ref 13–56)
ALBUMIN SERPL-MCNC: 2.5 G/DL (ref 3.2–5)
ALKALINE PHOSPHATASE: 65 U/L (ref 45–117)
ANION GAP: 6 (ref 5–15)
AST(SGOT): 19 U/L (ref 15–37)
BUN SERPL-MCNC: 9 MG/DL (ref 7–18)
BUN/CREAT RATIO: 14.5 RATIO (ref 10–20)
CALCIUM SERPL-MCNC: 7.7 MG/DL (ref 8.5–10.1)
CARBON DIOXIDE: 25 MMOL/L (ref 21–32)
CHLORIDE: 111 MMOL/L (ref 98–107)
DEPRECATED RDW RBC: 42.6 FL (ref 35.1–43.9)
ERYTHROCYTE [DISTWIDTH] IN BLOOD: 13.2 % (ref 11.6–14.6)
EST GLOM FILT RATE - AFR AMER: 145 ML/MIN (ref 60–?)
ESTIMATED CREATININE CLEARANCE: 95.3 ML/MIN
GLOBULIN: 3.4 G/DL (ref 2.2–4.2)
GLUCOSE: 131 MG/DL (ref 74–106)
HCT VFR BLD AUTO: 32.3 % (ref 37–47)
HEMOGLOBIN: 9.9 G/DL (ref 12–15)
HGB BLD-MCNC: 9.9 G/DL (ref 12–15)
IMMATURE GRANULOCYTES COUNT: 0.04 X10^3/UL (ref 0–0)
MCV RBC: 88.5 FL (ref 81–99)
MEAN CORP HGB CONC: 30.7 G/DL (ref 32–36)
MEAN PLATELET VOL.: 9.8 FL (ref 6.2–12)
NRBC FLAGGED BY ANALYZER: 0 % (ref 0–5)
PLATELET # BLD: 198 K/MM3 (ref 150–450)
PLATELET COUNT: 198 K/MM3 (ref 150–450)
POTASSIUM: 3.3 MMOL/L (ref 3.5–5.1)
RBC # BLD AUTO: 3.65 M/MM3 (ref 4.2–5.4)
RBC DISTRIBUTION WIDTH CV: 13.2 % (ref 11.6–14.6)
RBC DISTRIBUTION WIDTH SD: 42.6 FL (ref 35.1–43.9)
WBC # BLD AUTO: 10.4 K/MM3 (ref 4.4–11)
WHITE BLOOD COUNT: 10.4 K/MM3 (ref 4.4–11)

## 2021-03-24 RX ADMIN — POTASSIUM CHLORIDE 40 MEQ: 1500 TABLET, EXTENDED RELEASE ORAL at 08:34

## 2021-03-24 RX ADMIN — SENNOSIDES 2 TABLET: 8.6 TABLET, FILM COATED ORAL at 14:30

## 2021-05-14 ENCOUNTER — HOSPITAL ENCOUNTER (OUTPATIENT)
Dept: HOSPITAL 100 - MS3 | Age: 31
Setting detail: OBSERVATION
Discharge: HOME | End: 2021-05-14
Payer: MEDICAID

## 2021-05-14 VITALS
OXYGEN SATURATION: 94 % | HEART RATE: 82 BPM | SYSTOLIC BLOOD PRESSURE: 107 MMHG | DIASTOLIC BLOOD PRESSURE: 72 MMHG | TEMPERATURE: 97.7 F | RESPIRATION RATE: 18 BRPM

## 2021-05-14 VITALS
HEART RATE: 78 BPM | DIASTOLIC BLOOD PRESSURE: 62 MMHG | SYSTOLIC BLOOD PRESSURE: 93 MMHG | RESPIRATION RATE: 18 BRPM | OXYGEN SATURATION: 99 % | TEMPERATURE: 98.24 F

## 2021-05-14 VITALS
SYSTOLIC BLOOD PRESSURE: 100 MMHG | DIASTOLIC BLOOD PRESSURE: 62 MMHG | HEART RATE: 78 BPM | RESPIRATION RATE: 18 BRPM | OXYGEN SATURATION: 95 % | TEMPERATURE: 97.6 F

## 2021-05-14 VITALS
HEART RATE: 83 BPM | RESPIRATION RATE: 17 BRPM | OXYGEN SATURATION: 93 % | SYSTOLIC BLOOD PRESSURE: 104 MMHG | DIASTOLIC BLOOD PRESSURE: 82 MMHG

## 2021-05-14 VITALS
HEART RATE: 92 BPM | DIASTOLIC BLOOD PRESSURE: 72 MMHG | SYSTOLIC BLOOD PRESSURE: 97 MMHG | OXYGEN SATURATION: 92 % | RESPIRATION RATE: 18 BRPM

## 2021-05-14 VITALS
HEART RATE: 96 BPM | DIASTOLIC BLOOD PRESSURE: 82 MMHG | RESPIRATION RATE: 17 BRPM | OXYGEN SATURATION: 87 % | SYSTOLIC BLOOD PRESSURE: 104 MMHG

## 2021-05-14 VITALS
OXYGEN SATURATION: 92 % | DIASTOLIC BLOOD PRESSURE: 70 MMHG | SYSTOLIC BLOOD PRESSURE: 104 MMHG | HEART RATE: 89 BPM | RESPIRATION RATE: 18 BRPM

## 2021-05-14 VITALS — BODY MASS INDEX: 30.6 KG/M2 | BODY MASS INDEX: 32.5 KG/M2

## 2021-05-14 VITALS
RESPIRATION RATE: 18 BRPM | SYSTOLIC BLOOD PRESSURE: 92 MMHG | DIASTOLIC BLOOD PRESSURE: 72 MMHG | HEART RATE: 90 BPM | TEMPERATURE: 97.9 F | OXYGEN SATURATION: 92 %

## 2021-05-14 VITALS — OXYGEN SATURATION: 97 %

## 2021-05-14 VITALS — OXYGEN SATURATION: 98 %

## 2021-05-14 DIAGNOSIS — J69.0: ICD-10-CM

## 2021-05-14 DIAGNOSIS — Z86.19: ICD-10-CM

## 2021-05-14 DIAGNOSIS — F17.200: ICD-10-CM

## 2021-05-14 DIAGNOSIS — F15.90: ICD-10-CM

## 2021-05-14 DIAGNOSIS — R06.89: ICD-10-CM

## 2021-05-14 DIAGNOSIS — L03.115: ICD-10-CM

## 2021-05-14 DIAGNOSIS — T40.1X1A: Primary | ICD-10-CM

## 2021-05-14 LAB
AMPHET UR-MCNC: POSITIVE NG/ML
ANION GAP: 5 (ref 5–15)
BARBITURATE URINE VISTA: NEGATIVE
BENZODIAZEPINE URINE VISTA: NEGATIVE
BUN SERPL-MCNC: 19 MG/DL (ref 7–18)
BUN/CREAT RATIO: 33.5 RATIO (ref 10–20)
CALCIUM SERPL-MCNC: 7.6 MG/DL (ref 8.5–10.1)
CARBON DIOXIDE: 25 MMOL/L (ref 21–32)
CHLORIDE: 109 MMOL/L (ref 98–107)
COCAINE URINE VISTA: POSITIVE
DEPRECATED RDW RBC: 43.5 FL (ref 35.1–43.9)
DRUG CONFIRMATION TO FOLLOW?: (no result)
ECSTACY URINE VISTA: NEGATIVE
ERYTHROCYTE [DISTWIDTH] IN BLOOD: 13.8 % (ref 11.6–14.6)
EST GLOM FILT RATE - AFR AMER: 161 ML/MIN (ref 60–?)
ESTIMATED CREATININE CLEARANCE: 103.66 ML/MIN
GLUCOSE: 92 MG/DL (ref 74–106)
HCT VFR BLD AUTO: 31.8 % (ref 37–47)
HEMOGLOBIN: 9.9 G/DL (ref 12–15)
HGB BLD-MCNC: 9.9 G/DL (ref 12–15)
IMMATURE GRANULOCYTES COUNT: 0.05 X10^3/UL (ref 0–0)
MCV RBC: 86.2 FL (ref 81–99)
MEAN CORP HGB CONC: 31.1 G/DL (ref 32–36)
MEAN PLATELET VOL.: 10.3 FL (ref 6.2–12)
METHADONE URINE VISTA: NEGATIVE
NRBC FLAGGED BY ANALYZER: 0 % (ref 0–5)
PCP UR QL: NEGATIVE
PH UR: 6 [PH]
PLATELET # BLD: 264 K/MM3 (ref 150–450)
PLATELET COUNT: 264 K/MM3 (ref 150–450)
POTASSIUM: 4.5 MMOL/L (ref 3.5–5.1)
RBC # BLD AUTO: 3.69 M/MM3 (ref 4.2–5.4)
RBC DISTRIBUTION WIDTH CV: 13.8 % (ref 11.6–14.6)
RBC DISTRIBUTION WIDTH SD: 43.5 FL (ref 35.1–43.9)
THC URINE VISTA: NEGATIVE
WBC # BLD AUTO: 14.7 K/MM3 (ref 4.4–11)
WHITE BLOOD COUNT: 14.7 K/MM3 (ref 4.4–11)

## 2021-05-14 PROCEDURE — G0379 DIRECT REFER HOSPITAL OBSERV: HCPCS

## 2021-05-14 PROCEDURE — 85025 COMPLETE CBC W/AUTO DIFF WBC: CPT

## 2021-05-14 PROCEDURE — 80048 BASIC METABOLIC PNL TOTAL CA: CPT

## 2021-05-14 PROCEDURE — G0378 HOSPITAL OBSERVATION PER HR: HCPCS

## 2021-05-14 PROCEDURE — 99218: CPT

## 2021-05-14 PROCEDURE — 80307 DRUG TEST PRSMV CHEM ANLYZR: CPT

## 2021-11-15 ENCOUNTER — HOSPITAL ENCOUNTER (OUTPATIENT)
Dept: HOSPITAL 100 - ED | Age: 31
Setting detail: OBSERVATION
LOS: 1 days | Discharge: LEFT BEFORE DISCHARGE | End: 2021-11-16
Payer: MEDICAID

## 2021-11-15 VITALS
BODY MASS INDEX: 32.1 KG/M2 | DIASTOLIC BLOOD PRESSURE: 89 MMHG | BODY MASS INDEX: 32.2 KG/M2 | SYSTOLIC BLOOD PRESSURE: 125 MMHG | RESPIRATION RATE: 16 BRPM | OXYGEN SATURATION: 100 % | TEMPERATURE: 97.88 F | HEART RATE: 115 BPM

## 2021-11-15 VITALS — HEART RATE: 98 BPM | DIASTOLIC BLOOD PRESSURE: 63 MMHG | SYSTOLIC BLOOD PRESSURE: 106 MMHG | RESPIRATION RATE: 16 BRPM

## 2021-11-15 DIAGNOSIS — F17.210: ICD-10-CM

## 2021-11-15 DIAGNOSIS — N15.1: ICD-10-CM

## 2021-11-15 DIAGNOSIS — F11.11: ICD-10-CM

## 2021-11-15 DIAGNOSIS — N12: Primary | ICD-10-CM

## 2021-11-15 DIAGNOSIS — I10: ICD-10-CM

## 2021-11-15 DIAGNOSIS — Z86.19: ICD-10-CM

## 2021-11-15 LAB
ALANINE AMINOTRANSFER ALT/SGPT: 32 U/L (ref 13–56)
ALBUMIN SERPL-MCNC: 3 G/DL (ref 3.2–5)
ALKALINE PHOSPHATASE: 64 U/L (ref 45–117)
ANION GAP: 4 (ref 5–15)
AST(SGOT): 15 U/L (ref 15–37)
B-HCG SERPL QL: NEGATIVE NEGATIVE
BILIRUB DIRECT SERPL-MCNC: 0.11 MG/DL (ref 0–0.3)
BUN SERPL-MCNC: 10 MG/DL (ref 7–18)
BUN/CREAT RATIO: 12.7 RATIO (ref 10–20)
CALCIUM SERPL-MCNC: 8.6 MG/DL (ref 8.5–10.1)
CARBON DIOXIDE: 27 MMOL/L (ref 21–32)
CHLORIDE: 105 MMOL/L (ref 98–107)
DEPRECATED RDW RBC: 41.1 FL (ref 35.1–43.9)
ERYTHROCYTE [DISTWIDTH] IN BLOOD: 13.2 % (ref 11.6–14.6)
EST GLOM FILT RATE - AFR AMER: 110 ML/MIN (ref 60–?)
ESTIMATED CREATININE CLEARANCE: 75.75 ML/MIN
GLOBULIN: 4.8 G/DL (ref 2.2–4.2)
GLUCOSE: 142 MG/DL (ref 74–106)
HCG SERPL QL: NEGATIVE NEGATIVE
HCT VFR BLD AUTO: 35.4 % (ref 37–47)
HEMOGLOBIN: 11.6 G/DL (ref 12–15)
HGB BLD-MCNC: 11.6 G/DL (ref 12–15)
IMMATURE GRANULOCYTES COUNT: 0.05 X10^3/UL (ref 0–0)
INTERNAL QC VALIDATED?: (no result)
LEUKOCYTE ESTERASE UR QL STRIP: 500 /UL
LIPASE: 73 U/L (ref 73–393)
MCV RBC: 84.7 FL (ref 81–99)
MEAN CORP HGB CONC: 32.8 G/DL (ref 32–36)
MEAN PLATELET VOL.: 10.3 FL (ref 6.2–12)
MUCOUS THREADS URNS QL MICRO: (no result) /HPF
NRBC FLAGGED BY ANALYZER: 0 % (ref 0–5)
PLATELET # BLD: 214 K/MM3 (ref 150–450)
PLATELET COUNT: 214 K/MM3 (ref 150–450)
POTASSIUM: 4 MMOL/L (ref 3.5–5.1)
PROT UR QL STRIP.AUTO: 30 MG/DL
RBC # BLD AUTO: 4.18 M/MM3 (ref 4.2–5.4)
RBC DISTRIBUTION WIDTH CV: 13.2 % (ref 11.6–14.6)
RBC DISTRIBUTION WIDTH SD: 41.1 FL (ref 35.1–43.9)
RBC UR QL: (no result) /HPF (ref 0–5)
RBC UR QL: 25 /UL
SP GR UR: 1.01 (ref 1–1.03)
SQUAMOUS URNS QL MICRO: (no result) /HPF (ref 5–10)
URINE PRESERVATIVE: (no result)
WBC # BLD AUTO: 12.7 K/MM3 (ref 4.4–11)
WHITE BLOOD COUNT: 12.7 K/MM3 (ref 4.4–11)

## 2021-11-15 PROCEDURE — 87186 SC STD MICRODIL/AGAR DIL: CPT

## 2021-11-15 PROCEDURE — 97802 MEDICAL NUTRITION INDIV IN: CPT

## 2021-11-15 PROCEDURE — 99406 BEHAV CHNG SMOKING 3-10 MIN: CPT

## 2021-11-15 PROCEDURE — 81001 URINALYSIS AUTO W/SCOPE: CPT

## 2021-11-15 PROCEDURE — 87088 URINE BACTERIA CULTURE: CPT

## 2021-11-15 PROCEDURE — 80048 BASIC METABOLIC PNL TOTAL CA: CPT

## 2021-11-15 PROCEDURE — 76705 ECHO EXAM OF ABDOMEN: CPT

## 2021-11-15 PROCEDURE — 74178 CT ABD&PLV WO CNTR FLWD CNTR: CPT

## 2021-11-15 PROCEDURE — 36415 COLL VENOUS BLD VENIPUNCTURE: CPT

## 2021-11-15 PROCEDURE — 84703 CHORIONIC GONADOTROPIN ASSAY: CPT

## 2021-11-15 PROCEDURE — 99285 EMERGENCY DEPT VISIT HI MDM: CPT

## 2021-11-15 PROCEDURE — 80076 HEPATIC FUNCTION PANEL: CPT

## 2021-11-15 PROCEDURE — 85025 COMPLETE CBC W/AUTO DIFF WBC: CPT

## 2021-11-15 PROCEDURE — 80307 DRUG TEST PRSMV CHEM ANLYZR: CPT

## 2021-11-15 PROCEDURE — 83690 ASSAY OF LIPASE: CPT

## 2021-11-15 PROCEDURE — 87086 URINE CULTURE/COLONY COUNT: CPT

## 2021-11-15 PROCEDURE — A4216 STERILE WATER/SALINE, 10 ML: HCPCS

## 2021-11-15 RX ADMIN — SODIUM CHLORIDE 1000 ML: 9 INJECTION, SOLUTION INTRAVENOUS at 22:53

## 2021-11-15 RX ADMIN — KETOROLAC TROMETHAMINE 30 MG: 30 INJECTION, SOLUTION INTRAMUSCULAR; INTRAVENOUS at 23:36

## 2021-11-16 ENCOUNTER — HOSPITAL ENCOUNTER (INPATIENT)
Dept: HOSPITAL 100 - ED | Age: 31
LOS: 3 days | Discharge: HOME | DRG: 463 | End: 2021-11-19
Payer: MEDICAID

## 2021-11-16 VITALS
OXYGEN SATURATION: 100 % | RESPIRATION RATE: 16 BRPM | TEMPERATURE: 98.6 F | SYSTOLIC BLOOD PRESSURE: 117 MMHG | DIASTOLIC BLOOD PRESSURE: 72 MMHG | HEART RATE: 84 BPM

## 2021-11-16 VITALS
RESPIRATION RATE: 16 BRPM | OXYGEN SATURATION: 100 % | DIASTOLIC BLOOD PRESSURE: 71 MMHG | HEART RATE: 83 BPM | SYSTOLIC BLOOD PRESSURE: 112 MMHG | TEMPERATURE: 98.42 F

## 2021-11-16 VITALS
SYSTOLIC BLOOD PRESSURE: 131 MMHG | DIASTOLIC BLOOD PRESSURE: 78 MMHG | TEMPERATURE: 98.24 F | HEART RATE: 108 BPM | RESPIRATION RATE: 16 BRPM | BODY MASS INDEX: 32.5 KG/M2 | OXYGEN SATURATION: 98 %

## 2021-11-16 VITALS
SYSTOLIC BLOOD PRESSURE: 99 MMHG | RESPIRATION RATE: 16 BRPM | TEMPERATURE: 98.06 F | HEART RATE: 88 BPM | DIASTOLIC BLOOD PRESSURE: 56 MMHG

## 2021-11-16 DIAGNOSIS — F19.10: ICD-10-CM

## 2021-11-16 DIAGNOSIS — N10: Primary | ICD-10-CM

## 2021-11-16 DIAGNOSIS — F17.210: ICD-10-CM

## 2021-11-16 DIAGNOSIS — B18.2: ICD-10-CM

## 2021-11-16 DIAGNOSIS — Z87.442: ICD-10-CM

## 2021-11-16 DIAGNOSIS — F32.A: ICD-10-CM

## 2021-11-16 DIAGNOSIS — I10: ICD-10-CM

## 2021-11-16 DIAGNOSIS — D50.9: ICD-10-CM

## 2021-11-16 DIAGNOSIS — L01.00: ICD-10-CM

## 2021-11-16 LAB
AMPHET UR-MCNC: POSITIVE NG/ML
ANION GAP: 6 (ref 5–15)
BARBITURATE URINE VISTA: NEGATIVE
BENZODIAZEPINE URINE VISTA: NEGATIVE
BUN SERPL-MCNC: 12 MG/DL (ref 7–18)
BUN/CREAT RATIO: 18.8 RATIO (ref 10–20)
CALCIUM SERPL-MCNC: 7.7 MG/DL (ref 8.5–10.1)
CARBON DIOXIDE: 20 MMOL/L (ref 21–32)
CHLORIDE: 109 MMOL/L (ref 98–107)
COCAINE URINE VISTA: NEGATIVE
DEPRECATED RDW RBC: 39.8 FL (ref 35.1–43.9)
DEPRECATED RDW RBC: 41.2 FL (ref 35.1–43.9)
DRUG CONFIRMATION TO FOLLOW?: (no result)
ECSTACY URINE VISTA: NEGATIVE
ERYTHROCYTE [DISTWIDTH] IN BLOOD: 12.9 % (ref 11.6–14.6)
ERYTHROCYTE [DISTWIDTH] IN BLOOD: 13.2 % (ref 11.6–14.6)
EST GLOM FILT RATE - AFR AMER: 140 ML/MIN (ref 60–?)
ESTIMATED CREATININE CLEARANCE: 92.32 ML/MIN
GLUCOSE: 94 MG/DL (ref 74–106)
HCT VFR BLD AUTO: 29.8 % (ref 37–47)
HCT VFR BLD AUTO: 31.6 % (ref 37–47)
HEMOGLOBIN: 10.4 G/DL (ref 12–15)
HEMOGLOBIN: 9.7 G/DL (ref 12–15)
HGB BLD-MCNC: 10.4 G/DL (ref 12–15)
HGB BLD-MCNC: 9.7 G/DL (ref 12–15)
IMMATURE GRANULOCYTES COUNT: 0.03 X10^3/UL (ref 0–0)
IMMATURE GRANULOCYTES COUNT: 0.09 X10^3/UL (ref 0–0)
MCV RBC: 84.9 FL (ref 81–99)
MCV RBC: 85.1 FL (ref 81–99)
MEAN CORP HGB CONC: 32.6 G/DL (ref 32–36)
MEAN CORP HGB CONC: 32.9 G/DL (ref 32–36)
MEAN PLATELET VOL.: 10.1 FL (ref 6.2–12)
MEAN PLATELET VOL.: 10.2 FL (ref 6.2–12)
METHADONE URINE VISTA: NEGATIVE
NRBC FLAGGED BY ANALYZER: 0 % (ref 0–5)
NRBC FLAGGED BY ANALYZER: 0 % (ref 0–5)
PCP UR QL: NEGATIVE
PH UR: 7 [PH]
PLATELET # BLD: 183 K/MM3 (ref 150–450)
PLATELET # BLD: 212 K/MM3 (ref 150–450)
PLATELET COUNT: 183 K/MM3 (ref 150–450)
PLATELET COUNT: 212 K/MM3 (ref 150–450)
POTASSIUM: 4.1 MMOL/L (ref 3.5–5.1)
RBC # BLD AUTO: 3.5 M/MM3 (ref 4.2–5.4)
RBC # BLD AUTO: 3.72 M/MM3 (ref 4.2–5.4)
RBC DISTRIBUTION WIDTH CV: 12.9 % (ref 11.6–14.6)
RBC DISTRIBUTION WIDTH CV: 13.2 % (ref 11.6–14.6)
RBC DISTRIBUTION WIDTH SD: 39.8 FL (ref 35.1–43.9)
RBC DISTRIBUTION WIDTH SD: 41.2 FL (ref 35.1–43.9)
THC URINE VISTA: NEGATIVE
WBC # BLD AUTO: 8.9 K/MM3 (ref 4.4–11)
WBC # BLD AUTO: 9.4 K/MM3 (ref 4.4–11)
WHITE BLOOD COUNT: 8.9 K/MM3 (ref 4.4–11)
WHITE BLOOD COUNT: 9.4 K/MM3 (ref 4.4–11)

## 2021-11-16 PROCEDURE — 82607 VITAMIN B-12: CPT

## 2021-11-16 PROCEDURE — 87186 SC STD MICRODIL/AGAR DIL: CPT

## 2021-11-16 PROCEDURE — 87350 HEPATITIS BE AG IA: CPT

## 2021-11-16 PROCEDURE — 99285 EMERGENCY DEPT VISIT HI MDM: CPT

## 2021-11-16 PROCEDURE — 84703 CHORIONIC GONADOTROPIN ASSAY: CPT

## 2021-11-16 PROCEDURE — 85025 COMPLETE CBC W/AUTO DIFF WBC: CPT

## 2021-11-16 PROCEDURE — 97802 MEDICAL NUTRITION INDIV IN: CPT

## 2021-11-16 PROCEDURE — 83550 IRON BINDING TEST: CPT

## 2021-11-16 PROCEDURE — 80048 BASIC METABOLIC PNL TOTAL CA: CPT

## 2021-11-16 PROCEDURE — 96365 THER/PROPH/DIAG IV INF INIT: CPT

## 2021-11-16 PROCEDURE — 80307 DRUG TEST PRSMV CHEM ANLYZR: CPT

## 2021-11-16 PROCEDURE — 99218: CPT

## 2021-11-16 PROCEDURE — 81001 URINALYSIS AUTO W/SCOPE: CPT

## 2021-11-16 PROCEDURE — 76705 ECHO EXAM OF ABDOMEN: CPT

## 2021-11-16 PROCEDURE — G0378 HOSPITAL OBSERVATION PER HR: HCPCS

## 2021-11-16 PROCEDURE — 80053 COMPREHEN METABOLIC PANEL: CPT

## 2021-11-16 PROCEDURE — 87340 HEPATITIS B SURFACE AG IA: CPT

## 2021-11-16 PROCEDURE — 86803 HEPATITIS C AB TEST: CPT

## 2021-11-16 PROCEDURE — 96367 TX/PROPH/DG ADDL SEQ IV INF: CPT

## 2021-11-16 PROCEDURE — 83540 ASSAY OF IRON: CPT

## 2021-11-16 PROCEDURE — 96361 HYDRATE IV INFUSION ADD-ON: CPT

## 2021-11-16 PROCEDURE — 87088 URINE BACTERIA CULTURE: CPT

## 2021-11-16 PROCEDURE — 86705 HEP B CORE ANTIBODY IGM: CPT

## 2021-11-16 PROCEDURE — 86704 HEP B CORE ANTIBODY TOTAL: CPT

## 2021-11-16 PROCEDURE — 86707 HEPATITIS BE ANTIBODY: CPT

## 2021-11-16 PROCEDURE — 36415 COLL VENOUS BLD VENIPUNCTURE: CPT

## 2021-11-16 PROCEDURE — 82728 ASSAY OF FERRITIN: CPT

## 2021-11-16 PROCEDURE — 99406 BEHAV CHNG SMOKING 3-10 MIN: CPT

## 2021-11-16 PROCEDURE — 99251: CPT

## 2021-11-16 PROCEDURE — 82746 ASSAY OF FOLIC ACID SERUM: CPT

## 2021-11-16 PROCEDURE — 74178 CT ABD&PLV WO CNTR FLWD CNTR: CPT

## 2021-11-16 PROCEDURE — 86703 HIV-1/HIV-2 1 RESULT ANTBDY: CPT

## 2021-11-16 PROCEDURE — 96376 TX/PRO/DX INJ SAME DRUG ADON: CPT

## 2021-11-16 PROCEDURE — 80076 HEPATIC FUNCTION PANEL: CPT

## 2021-11-16 PROCEDURE — 96366 THER/PROPH/DIAG IV INF ADDON: CPT

## 2021-11-16 PROCEDURE — 96375 TX/PRO/DX INJ NEW DRUG ADDON: CPT

## 2021-11-16 PROCEDURE — 83690 ASSAY OF LIPASE: CPT

## 2021-11-16 PROCEDURE — 86706 HEP B SURFACE ANTIBODY: CPT

## 2021-11-16 PROCEDURE — 74177 CT ABD & PELVIS W/CONTRAST: CPT

## 2021-11-16 PROCEDURE — A4216 STERILE WATER/SALINE, 10 ML: HCPCS

## 2021-11-16 PROCEDURE — 87086 URINE CULTURE/COLONY COUNT: CPT

## 2021-11-16 PROCEDURE — G0463 HOSPITAL OUTPT CLINIC VISIT: HCPCS

## 2021-11-16 RX ADMIN — SODIUM CHLORIDE, PRESERVATIVE FREE 0 ML: 5 INJECTION INTRAVENOUS at 09:43

## 2021-11-16 RX ADMIN — SODIUM CHLORIDE 1000 ML: 9 INJECTION, SOLUTION INTRAVENOUS at 23:19

## 2021-11-16 RX ADMIN — SODIUM CHLORIDE 150 ML: 9 INJECTION, SOLUTION INTRAVENOUS at 02:29

## 2021-11-16 RX ADMIN — SODIUM CHLORIDE 75 ML: 9 INJECTION, SOLUTION INTRAVENOUS at 03:04

## 2021-11-17 VITALS
SYSTOLIC BLOOD PRESSURE: 125 MMHG | RESPIRATION RATE: 20 BRPM | DIASTOLIC BLOOD PRESSURE: 73 MMHG | HEART RATE: 90 BPM | OXYGEN SATURATION: 97 % | TEMPERATURE: 97.34 F

## 2021-11-17 VITALS
SYSTOLIC BLOOD PRESSURE: 120 MMHG | TEMPERATURE: 97.7 F | OXYGEN SATURATION: 99 % | HEART RATE: 85 BPM | DIASTOLIC BLOOD PRESSURE: 67 MMHG | RESPIRATION RATE: 18 BRPM

## 2021-11-17 VITALS
DIASTOLIC BLOOD PRESSURE: 71 MMHG | SYSTOLIC BLOOD PRESSURE: 120 MMHG | RESPIRATION RATE: 16 BRPM | TEMPERATURE: 98.06 F | HEART RATE: 86 BPM | OXYGEN SATURATION: 100 %

## 2021-11-17 VITALS
HEART RATE: 87 BPM | SYSTOLIC BLOOD PRESSURE: 123 MMHG | TEMPERATURE: 97.9 F | OXYGEN SATURATION: 100 % | DIASTOLIC BLOOD PRESSURE: 74 MMHG | RESPIRATION RATE: 16 BRPM

## 2021-11-17 VITALS
DIASTOLIC BLOOD PRESSURE: 79 MMHG | SYSTOLIC BLOOD PRESSURE: 138 MMHG | HEART RATE: 85 BPM | OXYGEN SATURATION: 100 % | TEMPERATURE: 98.24 F | RESPIRATION RATE: 16 BRPM

## 2021-11-17 VITALS — OXYGEN SATURATION: 100 %

## 2021-11-17 VITALS — RESPIRATION RATE: 16 BRPM

## 2021-11-17 LAB
ALANINE AMINOTRANSFER ALT/SGPT: 27 U/L (ref 13–56)
ALANINE AMINOTRANSFER ALT/SGPT: 29 U/L (ref 13–56)
ALBUMIN SERPL-MCNC: 2.5 G/DL (ref 3.2–5)
ALBUMIN SERPL-MCNC: 2.7 G/DL (ref 3.2–5)
ALKALINE PHOSPHATASE: 53 U/L (ref 45–117)
ALKALINE PHOSPHATASE: 54 U/L (ref 45–117)
AMPHET UR-MCNC: POSITIVE NG/ML
ANION GAP: 6 (ref 5–15)
ANION GAP: 6 (ref 5–15)
AST(SGOT): 13 U/L (ref 15–37)
AST(SGOT): 13 U/L (ref 15–37)
BARBITURATE URINE VISTA: NEGATIVE
BENZODIAZEPINE URINE VISTA: NEGATIVE
BUN SERPL-MCNC: 10 MG/DL (ref 7–18)
BUN SERPL-MCNC: 9 MG/DL (ref 7–18)
BUN/CREAT RATIO: 13.6 RATIO (ref 10–20)
BUN/CREAT RATIO: 16.7 RATIO (ref 10–20)
CALCIUM SERPL-MCNC: 8.2 MG/DL (ref 8.5–10.1)
CALCIUM SERPL-MCNC: 8.4 MG/DL (ref 8.5–10.1)
CARBON DIOXIDE: 23 MMOL/L (ref 21–32)
CARBON DIOXIDE: 25 MMOL/L (ref 21–32)
CHLORIDE: 108 MMOL/L (ref 98–107)
CHLORIDE: 109 MMOL/L (ref 98–107)
COCAINE URINE VISTA: NEGATIVE
DEPRECATED RDW RBC: 40.7 FL (ref 35.1–43.9)
DRUG CONFIRMATION TO FOLLOW?: (no result)
ECSTACY URINE VISTA: NEGATIVE
ERYTHROCYTE [DISTWIDTH] IN BLOOD: 13 % (ref 11.6–14.6)
EST GLOM FILT RATE - AFR AMER: 134 ML/MIN (ref 60–?)
EST GLOM FILT RATE - AFR AMER: 150 ML/MIN (ref 60–?)
ESTIMATED CREATININE CLEARANCE: 89.53 ML/MIN
ESTIMATED CREATININE CLEARANCE: 98.48 ML/MIN
FERRITIN SERPL-MCNC: 109 NG/ML (ref 8–252)
FOLATES, (FOLIC ACID): 11 NG/ML (ref 3.1–55.4)
GLOBULIN: 4.3 G/DL (ref 2.2–4.2)
GLOBULIN: 4.6 G/DL (ref 2.2–4.2)
GLUCOSE: 105 MG/DL (ref 74–106)
GLUCOSE: 93 MG/DL (ref 74–106)
HCT VFR BLD AUTO: 31.6 % (ref 37–47)
HEMOGLOBIN: 10.2 G/DL (ref 12–15)
HGB BLD-MCNC: 10.2 G/DL (ref 12–15)
IMMATURE GRANULOCYTES COUNT: 0.02 X10^3/UL (ref 0–0)
IRON BINDING CAPACITY,TOTAL: 299 UG/DL (ref 250–450)
IRON SATN MFR SERPL: 3 % (ref 15–55)
IRON SPEC-MCNT: 9 UG/DL (ref 50–170)
LEUKOCYTE ESTERASE UR QL STRIP: 500 /UL
MCV RBC: 85.6 FL (ref 81–99)
MEAN CORP HGB CONC: 32.3 G/DL (ref 32–36)
MEAN PLATELET VOL.: 10.2 FL (ref 6.2–12)
METHADONE URINE VISTA: NEGATIVE
MUCOUS THREADS URNS QL MICRO: (no result) /HPF
NRBC FLAGGED BY ANALYZER: 0 % (ref 0–5)
PCP UR QL: NEGATIVE
PH UR: 7 [PH]
PLATELET # BLD: 203 K/MM3 (ref 150–450)
PLATELET COUNT: 203 K/MM3 (ref 150–450)
POTASSIUM: 3.5 MMOL/L (ref 3.5–5.1)
POTASSIUM: 3.9 MMOL/L (ref 3.5–5.1)
PROT UR QL STRIP.AUTO: 15 MG/DL
RBC # BLD AUTO: 3.69 M/MM3 (ref 4.2–5.4)
RBC DISTRIBUTION WIDTH CV: 13 % (ref 11.6–14.6)
RBC DISTRIBUTION WIDTH SD: 40.7 FL (ref 35.1–43.9)
RBC UR QL: (no result) /HPF (ref 0–5)
RBC UR QL: 10 /UL
SP GR UR: 1 (ref 1–1.03)
SQUAMOUS URNS QL MICRO: (no result) /HPF (ref 5–10)
THC URINE VISTA: NEGATIVE
URINE PRESERVATIVE: (no result)
VITAMIN B12: 195 PG/ML (ref 211–911)
WBC # BLD AUTO: 8.1 K/MM3 (ref 4.4–11)
WHITE BLOOD COUNT: 8.1 K/MM3 (ref 4.4–11)

## 2021-11-17 RX ADMIN — SODIUM CHLORIDE 150 ML: 9 INJECTION, SOLUTION INTRAVENOUS at 12:23

## 2021-11-17 RX ADMIN — SODIUM CHLORIDE 150 ML: 9 INJECTION, SOLUTION INTRAVENOUS at 19:35

## 2021-11-17 RX ADMIN — SODIUM CHLORIDE, PRESERVATIVE FREE 0 ML: 5 INJECTION INTRAVENOUS at 18:17

## 2021-11-17 RX ADMIN — HYDROMORPHONE HYDROCHLORIDE 0.5 MG: 1 INJECTION, SOLUTION INTRAMUSCULAR; INTRAVENOUS; SUBCUTANEOUS at 04:11

## 2021-11-17 RX ADMIN — HYDROMORPHONE HYDROCHLORIDE 0.5 MG: 1 INJECTION, SOLUTION INTRAMUSCULAR; INTRAVENOUS; SUBCUTANEOUS at 23:43

## 2021-11-17 RX ADMIN — SODIUM CHLORIDE, PRESERVATIVE FREE 0 ML: 5 INJECTION INTRAVENOUS at 13:44

## 2021-11-17 RX ADMIN — LEVOFLOXACIN 100 MG: 5 INJECTION, SOLUTION INTRAVENOUS at 04:22

## 2021-11-17 RX ADMIN — HYDROMORPHONE HYDROCHLORIDE 0.5 MG: 1 INJECTION, SOLUTION INTRAMUSCULAR; INTRAVENOUS; SUBCUTANEOUS at 18:17

## 2021-11-17 RX ADMIN — HYDROMORPHONE HYDROCHLORIDE 0.5 MG: 1 INJECTION, SOLUTION INTRAMUSCULAR; INTRAVENOUS; SUBCUTANEOUS at 08:29

## 2021-11-17 RX ADMIN — SODIUM CHLORIDE, PRESERVATIVE FREE 0 ML: 5 INJECTION INTRAVENOUS at 23:44

## 2021-11-17 RX ADMIN — SODIUM CHLORIDE 150 ML: 9 INJECTION, SOLUTION INTRAVENOUS at 04:11

## 2021-11-17 RX ADMIN — HYDROMORPHONE HYDROCHLORIDE 0.5 MG: 1 INJECTION, SOLUTION INTRAMUSCULAR; INTRAVENOUS; SUBCUTANEOUS at 13:43

## 2021-11-18 VITALS
OXYGEN SATURATION: 96 % | HEART RATE: 90 BPM | TEMPERATURE: 98.24 F | DIASTOLIC BLOOD PRESSURE: 71 MMHG | RESPIRATION RATE: 18 BRPM | SYSTOLIC BLOOD PRESSURE: 137 MMHG

## 2021-11-18 VITALS
DIASTOLIC BLOOD PRESSURE: 77 MMHG | HEART RATE: 83 BPM | RESPIRATION RATE: 18 BRPM | OXYGEN SATURATION: 99 % | SYSTOLIC BLOOD PRESSURE: 119 MMHG | TEMPERATURE: 97.4 F

## 2021-11-18 VITALS
RESPIRATION RATE: 18 BRPM | SYSTOLIC BLOOD PRESSURE: 122 MMHG | DIASTOLIC BLOOD PRESSURE: 81 MMHG | OXYGEN SATURATION: 99 % | HEART RATE: 98 BPM | TEMPERATURE: 98.4 F

## 2021-11-18 VITALS
DIASTOLIC BLOOD PRESSURE: 82 MMHG | HEART RATE: 86 BPM | TEMPERATURE: 98.3 F | OXYGEN SATURATION: 99 % | SYSTOLIC BLOOD PRESSURE: 125 MMHG | RESPIRATION RATE: 18 BRPM

## 2021-11-18 LAB
ANION GAP: 4 (ref 5–15)
BUN SERPL-MCNC: 9 MG/DL (ref 7–18)
BUN/CREAT RATIO: 16.5 RATIO (ref 10–20)
CALCIUM SERPL-MCNC: 8.2 MG/DL (ref 8.5–10.1)
CARBON DIOXIDE: 23 MMOL/L (ref 21–32)
CHLORIDE: 112 MMOL/L (ref 98–107)
DEPRECATED RDW RBC: 39.8 FL (ref 35.1–43.9)
ERYTHROCYTE [DISTWIDTH] IN BLOOD: 13 % (ref 11.6–14.6)
EST GLOM FILT RATE - AFR AMER: 167 ML/MIN (ref 60–?)
ESTIMATED CREATININE CLEARANCE: 106.45 ML/MIN
GLUCOSE: 87 MG/DL (ref 74–106)
HCT VFR BLD AUTO: 31 % (ref 37–47)
HEMOGLOBIN: 9.9 G/DL (ref 12–15)
HGB BLD-MCNC: 9.9 G/DL (ref 12–15)
IMMATURE GRANULOCYTES COUNT: 0.02 X10^3/UL (ref 0–0)
MCV RBC: 84.7 FL (ref 81–99)
MEAN CORP HGB CONC: 31.9 G/DL (ref 32–36)
MEAN PLATELET VOL.: 9.8 FL (ref 6.2–12)
NRBC FLAGGED BY ANALYZER: 0 % (ref 0–5)
PLATELET # BLD: 252 K/MM3 (ref 150–450)
PLATELET COUNT: 252 K/MM3 (ref 150–450)
POTASSIUM: 3.8 MMOL/L (ref 3.5–5.1)
RBC # BLD AUTO: 3.66 M/MM3 (ref 4.2–5.4)
RBC DISTRIBUTION WIDTH CV: 13 % (ref 11.6–14.6)
RBC DISTRIBUTION WIDTH SD: 39.8 FL (ref 35.1–43.9)
WBC # BLD AUTO: 8 K/MM3 (ref 4.4–11)
WHITE BLOOD COUNT: 8 K/MM3 (ref 4.4–11)

## 2021-11-18 RX ADMIN — HYDROMORPHONE HYDROCHLORIDE 0.5 MG: 1 INJECTION, SOLUTION INTRAMUSCULAR; INTRAVENOUS; SUBCUTANEOUS at 04:18

## 2021-11-18 RX ADMIN — MUPIROCIN 1 APPLIC: 20 OINTMENT TOPICAL at 13:15

## 2021-11-18 RX ADMIN — HYDROMORPHONE HYDROCHLORIDE 0.5 MG: 1 INJECTION, SOLUTION INTRAMUSCULAR; INTRAVENOUS; SUBCUTANEOUS at 13:22

## 2021-11-18 RX ADMIN — SODIUM CHLORIDE, PRESERVATIVE FREE 0 ML: 5 INJECTION INTRAVENOUS at 17:25

## 2021-11-18 RX ADMIN — SODIUM CHLORIDE 150 ML: 9 INJECTION, SOLUTION INTRAVENOUS at 08:41

## 2021-11-18 RX ADMIN — LEVOFLOXACIN 100 MG: 5 INJECTION, SOLUTION INTRAVENOUS at 08:43

## 2021-11-18 RX ADMIN — MUPIROCIN 1 APPLIC: 20 OINTMENT TOPICAL at 22:22

## 2021-11-18 RX ADMIN — SODIUM CHLORIDE 150 ML: 9 INJECTION, SOLUTION INTRAVENOUS at 17:19

## 2021-11-18 RX ADMIN — HYDROMORPHONE HYDROCHLORIDE 0.5 MG: 1 INJECTION, SOLUTION INTRAMUSCULAR; INTRAVENOUS; SUBCUTANEOUS at 17:25

## 2021-11-18 RX ADMIN — SODIUM CHLORIDE 150 ML: 9 INJECTION, SOLUTION INTRAVENOUS at 02:00

## 2021-11-18 RX ADMIN — Medication 1 LOZENGE: at 17:25

## 2021-11-19 VITALS
RESPIRATION RATE: 18 BRPM | TEMPERATURE: 98.1 F | DIASTOLIC BLOOD PRESSURE: 75 MMHG | SYSTOLIC BLOOD PRESSURE: 121 MMHG | OXYGEN SATURATION: 98 % | HEART RATE: 72 BPM

## 2021-11-19 VITALS
RESPIRATION RATE: 16 BRPM | HEART RATE: 74 BPM | TEMPERATURE: 98.24 F | DIASTOLIC BLOOD PRESSURE: 66 MMHG | OXYGEN SATURATION: 100 % | SYSTOLIC BLOOD PRESSURE: 125 MMHG

## 2021-11-19 VITALS
SYSTOLIC BLOOD PRESSURE: 111 MMHG | HEART RATE: 78 BPM | DIASTOLIC BLOOD PRESSURE: 75 MMHG | RESPIRATION RATE: 16 BRPM | OXYGEN SATURATION: 98 % | TEMPERATURE: 97.88 F

## 2021-11-19 LAB
ANION GAP: 7 (ref 5–15)
BUN SERPL-MCNC: 11 MG/DL (ref 7–18)
BUN/CREAT RATIO: 17.2 RATIO (ref 10–20)
CALCIUM SERPL-MCNC: 8.4 MG/DL (ref 8.5–10.1)
CARBON DIOXIDE: 24 MMOL/L (ref 21–32)
CHLORIDE: 108 MMOL/L (ref 98–107)
DEPRECATED RDW RBC: 38.3 FL (ref 35.1–43.9)
DIFFERENTIAL INDICATED: (no result)
ERYTHROCYTE [DISTWIDTH] IN BLOOD: 12.7 % (ref 11.6–14.6)
EST GLOM FILT RATE - AFR AMER: 139 ML/MIN (ref 60–?)
ESTIMATED CREATININE CLEARANCE: 91.48 ML/MIN
GLUCOSE: 137 MG/DL (ref 74–106)
HCT VFR BLD AUTO: 32.7 % (ref 37–47)
HEMOGLOBIN: 10.7 G/DL (ref 12–15)
HGB BLD-MCNC: 10.7 G/DL (ref 12–15)
IMMATURE GRANULOCYTES COUNT: 0.03 X10^3/UL (ref 0–0)
MCV RBC: 83.8 FL (ref 81–99)
MEAN CORP HGB CONC: 32.7 G/DL (ref 32–36)
MEAN PLATELET VOL.: 9.7 FL (ref 6.2–12)
NRBC FLAGGED BY ANALYZER: 0 % (ref 0–5)
PLATELET # BLD: 274 K/MM3 (ref 150–450)
PLATELET COUNT: 274 K/MM3 (ref 150–450)
POSITIVE MORPHOLOGY: YES
POTASSIUM: 3.8 MMOL/L (ref 3.5–5.1)
RBC # BLD AUTO: 3.9 M/MM3 (ref 4.2–5.4)
RBC DISTRIBUTION WIDTH CV: 12.7 % (ref 11.6–14.6)
RBC DISTRIBUTION WIDTH SD: 38.3 FL (ref 35.1–43.9)
SCAN SMEAR PER REVIEW CRITERIA: (no result)
WBC # BLD AUTO: 7 K/MM3 (ref 4.4–11)
WHITE BLOOD COUNT: 7 K/MM3 (ref 4.4–11)

## 2021-11-19 RX ADMIN — MUPIROCIN 1 APPLIC: 20 OINTMENT TOPICAL at 13:34

## 2021-11-19 RX ADMIN — SODIUM CHLORIDE, PRESERVATIVE FREE 0 ML: 5 INJECTION INTRAVENOUS at 10:00

## 2021-11-19 RX ADMIN — LEVOFLOXACIN 100 MG: 5 INJECTION, SOLUTION INTRAVENOUS at 09:56

## 2021-11-19 RX ADMIN — MUPIROCIN 1 APPLIC: 20 OINTMENT TOPICAL at 06:57

## 2021-11-19 RX ADMIN — HYDROMORPHONE HYDROCHLORIDE 0.5 MG: 1 INJECTION, SOLUTION INTRAMUSCULAR; INTRAVENOUS; SUBCUTANEOUS at 10:00

## 2021-11-19 RX ADMIN — SODIUM CHLORIDE 150 ML: 9 INJECTION, SOLUTION INTRAVENOUS at 06:57

## 2021-11-19 RX ADMIN — HYDROMORPHONE HYDROCHLORIDE 0.5 MG: 1 INJECTION, SOLUTION INTRAMUSCULAR; INTRAVENOUS; SUBCUTANEOUS at 02:51

## 2021-11-19 RX ADMIN — SODIUM CHLORIDE 150 ML: 9 INJECTION, SOLUTION INTRAVENOUS at 00:15

## 2023-06-13 ENCOUNTER — HOSPITAL ENCOUNTER (EMERGENCY)
Age: 33
Discharge: HOME | End: 2023-06-13
Payer: MEDICAID

## 2023-06-13 VITALS
TEMPERATURE: 97.3 F | OXYGEN SATURATION: 98 % | DIASTOLIC BLOOD PRESSURE: 71 MMHG | HEART RATE: 90 BPM | RESPIRATION RATE: 16 BRPM | SYSTOLIC BLOOD PRESSURE: 129 MMHG

## 2023-06-13 VITALS — BODY MASS INDEX: 42.1 KG/M2

## 2023-06-13 DIAGNOSIS — K02.9: ICD-10-CM

## 2023-06-13 DIAGNOSIS — Z90.49: ICD-10-CM

## 2023-06-13 DIAGNOSIS — F17.210: ICD-10-CM

## 2023-06-13 DIAGNOSIS — I10: ICD-10-CM

## 2023-06-13 DIAGNOSIS — L23.89: Primary | ICD-10-CM

## 2023-06-13 PROCEDURE — 99282 EMERGENCY DEPT VISIT SF MDM: CPT

## 2023-06-13 PROCEDURE — 99281 EMR DPT VST MAYX REQ PHY/QHP: CPT

## 2023-06-22 ENCOUNTER — HOSPITAL ENCOUNTER (OUTPATIENT)
Dept: HOSPITAL 100 - LAB | Age: 33
Discharge: HOME | End: 2023-06-22
Payer: MEDICAID

## 2023-06-22 DIAGNOSIS — F11.20: ICD-10-CM

## 2023-06-22 DIAGNOSIS — F10.20: Primary | ICD-10-CM

## 2023-06-22 LAB
ALANINE AMINOTRANSFER ALT/SGPT: 101 U/L (ref 13–56)
ALBUMIN SERPL-MCNC: 3.6 G/DL (ref 3.2–5)
ALKALINE PHOSPHATASE: 53 U/L (ref 45–117)
ANION GAP: 2 (ref 5–15)
AST(SGOT): 46 U/L (ref 15–37)
BUN SERPL-MCNC: 15 MG/DL (ref 7–18)
BUN/CREAT RATIO: 27.4 RATIO (ref 10–20)
CALCIUM SERPL-MCNC: 8.4 MG/DL (ref 8.5–10.1)
CARBON DIOXIDE: 26 MMOL/L (ref 21–32)
CHLORIDE: 110 MMOL/L (ref 98–107)
DEPRECATED RDW RBC: 40.5 FL (ref 35.1–43.9)
ERYTHROCYTE [DISTWIDTH] IN BLOOD: 13.1 % (ref 11.6–14.6)
EST GLOM FILT RATE - AFR AMER: 165 ML/MIN (ref 60–?)
GLOBULIN: 4 G/DL (ref 2.2–4.2)
GLUCOSE: 88 MG/DL (ref 74–106)
HCT VFR BLD AUTO: 37.2 % (ref 37–47)
HEMOGLOBIN: 12.2 G/DL (ref 12–15)
HEPATITIS C ANTIBODY: REACTIVE
HGB BLD-MCNC: 12.2 G/DL (ref 12–15)
IMMATURE GRANULOCYTES COUNT: 0.04 X10^3/UL (ref 0–0)
MCV RBC: 84.7 FL (ref 81–99)
MEAN CORP HGB CONC: 32.8 G/DL (ref 32–36)
MEAN PLATELET VOL.: 9.9 FL (ref 6.2–12)
NRBC FLAGGED BY ANALYZER: 0 % (ref 0–5)
PLATELET # BLD: 289 K/MM3 (ref 150–450)
PLATELET COUNT: 289 K/MM3 (ref 150–450)
POTASSIUM: 4.2 MMOL/L (ref 3.5–5.1)
RBC # BLD AUTO: 4.39 M/MM3 (ref 4.2–5.4)
RBC DISTRIBUTION WIDTH CV: 13.1 % (ref 11.6–14.6)
RBC DISTRIBUTION WIDTH SD: 40.5 FL (ref 35.1–43.9)
WBC # BLD AUTO: 9.7 K/MM3 (ref 4.4–11)
WHITE BLOOD COUNT: 9.7 K/MM3 (ref 4.4–11)

## 2023-06-22 PROCEDURE — 87522 HEPATITIS C REVRS TRNSCRPJ: CPT

## 2023-06-22 PROCEDURE — 80053 COMPREHEN METABOLIC PANEL: CPT

## 2023-06-22 PROCEDURE — 86706 HEP B SURFACE ANTIBODY: CPT

## 2023-06-22 PROCEDURE — 86708 HEPATITIS A ANTIBODY: CPT

## 2023-06-22 PROCEDURE — 87340 HEPATITIS B SURFACE AG IA: CPT

## 2023-06-22 PROCEDURE — 36415 COLL VENOUS BLD VENIPUNCTURE: CPT

## 2023-06-22 PROCEDURE — 85025 COMPLETE CBC W/AUTO DIFF WBC: CPT

## 2023-06-22 PROCEDURE — 86703 HIV-1/HIV-2 1 RESULT ANTBDY: CPT

## 2023-06-22 PROCEDURE — 86803 HEPATITIS C AB TEST: CPT

## 2023-06-26 LAB
HCV QUANT. RNA PCR: (no result) IU/ML
HCV RNA SERPL NAA+PROBE-ACNC: (no result) IU/ML

## 2023-08-03 ENCOUNTER — HOSPITAL ENCOUNTER (OUTPATIENT)
Age: 33
Discharge: HOME | End: 2023-08-03
Payer: MEDICAID

## 2023-08-03 DIAGNOSIS — F11.20: Primary | ICD-10-CM

## 2023-08-03 DIAGNOSIS — F10.20: ICD-10-CM

## 2023-08-03 DIAGNOSIS — F15.20: ICD-10-CM

## 2023-08-03 LAB
ALANINE AMINOTRANSFER ALT/SGPT: 66 U/L (ref 13–56)
ALBUMIN SERPL-MCNC: 3.3 G/DL (ref 3.2–5)
ALKALINE PHOSPHATASE: 61 U/L (ref 45–117)
ANION GAP: 8 (ref 5–15)
AST(SGOT): 30 U/L (ref 15–37)
BUN SERPL-MCNC: 15 MG/DL (ref 7–18)
BUN/CREAT RATIO: 21 RATIO (ref 10–20)
CALCIUM SERPL-MCNC: 8.4 MG/DL (ref 8.5–10.1)
CARBON DIOXIDE: 23 MMOL/L (ref 21–32)
CHLORIDE: 107 MMOL/L (ref 98–107)
DEPRECATED RDW RBC: 41.8 FL (ref 35.1–43.9)
ERYTHROCYTE [DISTWIDTH] IN BLOOD: 13.5 % (ref 11.6–14.6)
EST GLOM FILT RATE - AFR AMER: 121 ML/MIN (ref 60–?)
GLOBULIN: 4 G/DL (ref 2.2–4.2)
GLUCOSE: 189 MG/DL (ref 74–106)
HCT VFR BLD AUTO: 35.8 % (ref 37–47)
HEMOGLOBIN: 12 G/DL (ref 12–15)
HGB BLD-MCNC: 12 G/DL (ref 12–15)
IMMATURE GRANULOCYTES COUNT: 0.06 X10^3/UL (ref 0–0)
MCV RBC: 85.2 FL (ref 81–99)
MEAN CORP HGB CONC: 33.5 G/DL (ref 32–36)
MEAN PLATELET VOL.: 10 FL (ref 6.2–12)
NRBC FLAGGED BY ANALYZER: 0 % (ref 0–5)
PLATELET # BLD: 256 K/MM3 (ref 150–450)
PLATELET COUNT: 256 K/MM3 (ref 150–450)
POTASSIUM: 3.5 MMOL/L (ref 3.5–5.1)
RBC # BLD AUTO: 4.2 M/MM3 (ref 4.2–5.4)
RBC DISTRIBUTION WIDTH CV: 13.5 % (ref 11.6–14.6)
RBC DISTRIBUTION WIDTH SD: 41.8 FL (ref 35.1–43.9)
WBC # BLD AUTO: 11 K/MM3 (ref 4.4–11)
WHITE BLOOD COUNT: 11 K/MM3 (ref 4.4–11)

## 2023-08-03 PROCEDURE — 36415 COLL VENOUS BLD VENIPUNCTURE: CPT

## 2023-08-03 PROCEDURE — 80053 COMPREHEN METABOLIC PANEL: CPT

## 2023-08-03 PROCEDURE — 85025 COMPLETE CBC W/AUTO DIFF WBC: CPT

## 2023-08-16 ENCOUNTER — HOSPITAL ENCOUNTER (EMERGENCY)
Age: 33
Discharge: HOME | End: 2023-08-16
Payer: MEDICAID

## 2023-08-16 VITALS
DIASTOLIC BLOOD PRESSURE: 83 MMHG | TEMPERATURE: 97.16 F | SYSTOLIC BLOOD PRESSURE: 112 MMHG | RESPIRATION RATE: 20 BRPM | OXYGEN SATURATION: 97 % | HEART RATE: 71 BPM

## 2023-08-16 VITALS — RESPIRATION RATE: 16 BRPM

## 2023-08-16 VITALS — BODY MASS INDEX: 41.1 KG/M2

## 2023-08-16 DIAGNOSIS — I10: ICD-10-CM

## 2023-08-16 DIAGNOSIS — F17.210: ICD-10-CM

## 2023-08-16 DIAGNOSIS — Z90.49: ICD-10-CM

## 2023-08-16 DIAGNOSIS — B34.9: Primary | ICD-10-CM

## 2023-08-16 PROCEDURE — 99282 EMERGENCY DEPT VISIT SF MDM: CPT

## 2023-08-16 PROCEDURE — 87428 SARSCOV & INF VIR A&B AG IA: CPT

## 2024-10-29 ENCOUNTER — APPOINTMENT (OUTPATIENT)
Dept: VASCULAR MEDICINE | Facility: HOSPITAL | Age: 34
End: 2024-10-29
Payer: COMMERCIAL

## 2024-10-29 ENCOUNTER — HOSPITAL ENCOUNTER (INPATIENT)
Facility: HOSPITAL | Age: 34
LOS: 1 days | Discharge: HOME | End: 2024-10-30
Attending: EMERGENCY MEDICINE | Admitting: STUDENT IN AN ORGANIZED HEALTH CARE EDUCATION/TRAINING PROGRAM
Payer: COMMERCIAL

## 2024-10-29 ENCOUNTER — APPOINTMENT (OUTPATIENT)
Dept: CARDIOLOGY | Facility: HOSPITAL | Age: 34
End: 2024-10-29
Payer: COMMERCIAL

## 2024-10-29 DIAGNOSIS — M79.89 OTHER SPECIFIED SOFT TISSUE DISORDERS: ICD-10-CM

## 2024-10-29 DIAGNOSIS — I82.A11 ACUTE DEEP VEIN THROMBOSIS (DVT) OF AXILLARY VEIN OF RIGHT UPPER EXTREMITY (MULTI): Primary | ICD-10-CM

## 2024-10-29 DIAGNOSIS — I82.621 ACUTE EMBOLISM AND THROMBOSIS OF DEEP VEINS OF RIGHT UPPER EXTREMITY (MULTI): ICD-10-CM

## 2024-10-29 LAB
ALBUMIN SERPL BCP-MCNC: 3.7 G/DL (ref 3.4–5)
ALP SERPL-CCNC: 62 U/L (ref 33–110)
ALT SERPL W P-5'-P-CCNC: 36 U/L (ref 7–45)
ANION GAP SERPL CALC-SCNC: 7 MMOL/L (ref 10–20)
APTT PPP: 32 SECONDS (ref 27–38)
AST SERPL W P-5'-P-CCNC: 18 U/L (ref 9–39)
BASOPHILS # BLD AUTO: 0.02 X10*3/UL (ref 0–0.1)
BASOPHILS NFR BLD AUTO: 0.2 %
BILIRUB SERPL-MCNC: 0.2 MG/DL (ref 0–1.2)
BUN SERPL-MCNC: 16 MG/DL (ref 6–23)
CALCIUM SERPL-MCNC: 8.7 MG/DL (ref 8.6–10.3)
CARDIAC TROPONIN I PNL SERPL HS: <3 NG/L (ref 0–13)
CHLORIDE SERPL-SCNC: 107 MMOL/L (ref 98–107)
CO2 SERPL-SCNC: 28 MMOL/L (ref 21–32)
CREAT SERPL-MCNC: 0.62 MG/DL (ref 0.5–1.05)
D DIMER PPP FEU-MCNC: 1009 NG/ML FEU
EGFRCR SERPLBLD CKD-EPI 2021: >90 ML/MIN/1.73M*2
EOSINOPHIL # BLD AUTO: 0.49 X10*3/UL (ref 0–0.7)
EOSINOPHIL NFR BLD AUTO: 4.4 %
ERYTHROCYTE [DISTWIDTH] IN BLOOD BY AUTOMATED COUNT: 12.7 % (ref 11.5–14.5)
GLUCOSE SERPL-MCNC: 101 MG/DL (ref 74–99)
HCT VFR BLD AUTO: 34.4 % (ref 36–46)
HGB BLD-MCNC: 11.2 G/DL (ref 12–16)
HOLD SPECIMEN: NORMAL
HOLD SPECIMEN: NORMAL
IMM GRANULOCYTES # BLD AUTO: 0.04 X10*3/UL (ref 0–0.7)
IMM GRANULOCYTES NFR BLD AUTO: 0.4 % (ref 0–0.9)
INR PPP: 0.9 (ref 0.9–1.1)
LYMPHOCYTES # BLD AUTO: 2.71 X10*3/UL (ref 1.2–4.8)
LYMPHOCYTES NFR BLD AUTO: 24.2 %
MCH RBC QN AUTO: 27.8 PG (ref 26–34)
MCHC RBC AUTO-ENTMCNC: 32.6 G/DL (ref 32–36)
MCV RBC AUTO: 85 FL (ref 80–100)
MONOCYTES # BLD AUTO: 0.57 X10*3/UL (ref 0.1–1)
MONOCYTES NFR BLD AUTO: 5.1 %
NEUTROPHILS # BLD AUTO: 7.38 X10*3/UL (ref 1.2–7.7)
NEUTROPHILS NFR BLD AUTO: 65.7 %
NRBC BLD-RTO: 0 /100 WBCS (ref 0–0)
PLATELET # BLD AUTO: 251 X10*3/UL (ref 150–450)
POTASSIUM SERPL-SCNC: 3.7 MMOL/L (ref 3.5–5.3)
PROT SERPL-MCNC: 6.5 G/DL (ref 6.4–8.2)
PROTHROMBIN TIME: 11 SECONDS (ref 9.8–12.8)
RBC # BLD AUTO: 4.03 X10*6/UL (ref 4–5.2)
SODIUM SERPL-SCNC: 138 MMOL/L (ref 136–145)
UFH PPP CHRO-ACNC: 0.8 IU/ML
WBC # BLD AUTO: 11.2 X10*3/UL (ref 4.4–11.3)

## 2024-10-29 PROCEDURE — 93971 EXTREMITY STUDY: CPT

## 2024-10-29 PROCEDURE — 96374 THER/PROPH/DIAG INJ IV PUSH: CPT

## 2024-10-29 PROCEDURE — 93005 ELECTROCARDIOGRAM TRACING: CPT

## 2024-10-29 PROCEDURE — 99222 1ST HOSP IP/OBS MODERATE 55: CPT | Performed by: STUDENT IN AN ORGANIZED HEALTH CARE EDUCATION/TRAINING PROGRAM

## 2024-10-29 PROCEDURE — 85610 PROTHROMBIN TIME: CPT | Performed by: EMERGENCY MEDICINE

## 2024-10-29 PROCEDURE — 36415 COLL VENOUS BLD VENIPUNCTURE: CPT | Performed by: STUDENT IN AN ORGANIZED HEALTH CARE EDUCATION/TRAINING PROGRAM

## 2024-10-29 PROCEDURE — 2500000004 HC RX 250 GENERAL PHARMACY W/ HCPCS (ALT 636 FOR OP/ED): Performed by: EMERGENCY MEDICINE

## 2024-10-29 PROCEDURE — 84484 ASSAY OF TROPONIN QUANT: CPT | Performed by: INTERNAL MEDICINE

## 2024-10-29 PROCEDURE — 93971 EXTREMITY STUDY: CPT | Performed by: INTERNAL MEDICINE

## 2024-10-29 PROCEDURE — 85025 COMPLETE CBC W/AUTO DIFF WBC: CPT | Performed by: EMERGENCY MEDICINE

## 2024-10-29 PROCEDURE — 85520 HEPARIN ASSAY: CPT | Performed by: STUDENT IN AN ORGANIZED HEALTH CARE EDUCATION/TRAINING PROGRAM

## 2024-10-29 PROCEDURE — 2500000001 HC RX 250 WO HCPCS SELF ADMINISTERED DRUGS (ALT 637 FOR MEDICARE OP): Performed by: EMERGENCY MEDICINE

## 2024-10-29 PROCEDURE — 36415 COLL VENOUS BLD VENIPUNCTURE: CPT | Performed by: EMERGENCY MEDICINE

## 2024-10-29 PROCEDURE — 85379 FIBRIN DEGRADATION QUANT: CPT | Performed by: STUDENT IN AN ORGANIZED HEALTH CARE EDUCATION/TRAINING PROGRAM

## 2024-10-29 PROCEDURE — 93010 ELECTROCARDIOGRAM REPORT: CPT | Performed by: STUDENT IN AN ORGANIZED HEALTH CARE EDUCATION/TRAINING PROGRAM

## 2024-10-29 PROCEDURE — S4991 NICOTINE PATCH NONLEGEND: HCPCS | Performed by: STUDENT IN AN ORGANIZED HEALTH CARE EDUCATION/TRAINING PROGRAM

## 2024-10-29 PROCEDURE — 1200000002 HC GENERAL ROOM WITH TELEMETRY DAILY

## 2024-10-29 PROCEDURE — 2500000002 HC RX 250 W HCPCS SELF ADMINISTERED DRUGS (ALT 637 FOR MEDICARE OP, ALT 636 FOR OP/ED): Performed by: STUDENT IN AN ORGANIZED HEALTH CARE EDUCATION/TRAINING PROGRAM

## 2024-10-29 PROCEDURE — 80053 COMPREHEN METABOLIC PANEL: CPT | Performed by: EMERGENCY MEDICINE

## 2024-10-29 PROCEDURE — 85730 THROMBOPLASTIN TIME PARTIAL: CPT | Performed by: EMERGENCY MEDICINE

## 2024-10-29 PROCEDURE — 2500000004 HC RX 250 GENERAL PHARMACY W/ HCPCS (ALT 636 FOR OP/ED): Performed by: STUDENT IN AN ORGANIZED HEALTH CARE EDUCATION/TRAINING PROGRAM

## 2024-10-29 PROCEDURE — 99285 EMERGENCY DEPT VISIT HI MDM: CPT | Mod: 25

## 2024-10-29 RX ORDER — MORPHINE SULFATE 2 MG/ML
2 INJECTION, SOLUTION INTRAMUSCULAR; INTRAVENOUS EVERY 4 HOURS PRN
Status: DISCONTINUED | OUTPATIENT
Start: 2024-10-29 | End: 2024-10-30 | Stop reason: HOSPADM

## 2024-10-29 RX ORDER — IBUPROFEN 200 MG
1 TABLET ORAL DAILY
Status: DISCONTINUED | OUTPATIENT
Start: 2024-10-29 | End: 2024-10-30 | Stop reason: HOSPADM

## 2024-10-29 RX ORDER — ACETAMINOPHEN 325 MG/1
975 TABLET ORAL ONCE
Status: COMPLETED | OUTPATIENT
Start: 2024-10-29 | End: 2024-10-29

## 2024-10-29 RX ORDER — LORAZEPAM 2 MG/ML
0.5 INJECTION INTRAMUSCULAR EVERY 8 HOURS PRN
Status: DISCONTINUED | OUTPATIENT
Start: 2024-10-29 | End: 2024-10-30 | Stop reason: HOSPADM

## 2024-10-29 RX ORDER — HEPARIN SODIUM 10000 [USP'U]/100ML
0-4500 INJECTION, SOLUTION INTRAVENOUS CONTINUOUS
Status: DISCONTINUED | OUTPATIENT
Start: 2024-10-29 | End: 2024-10-30

## 2024-10-29 RX ORDER — HEPARIN SODIUM 10000 [USP'U]/100ML
0-4500 INJECTION, SOLUTION INTRAVENOUS CONTINUOUS
Status: DISCONTINUED | OUTPATIENT
Start: 2024-10-29 | End: 2024-10-29

## 2024-10-29 SDOH — SOCIAL STABILITY: SOCIAL INSECURITY: ABUSE: ADULT

## 2024-10-29 SDOH — ECONOMIC STABILITY: FOOD INSECURITY: WITHIN THE PAST 12 MONTHS, YOU WORRIED THAT YOUR FOOD WOULD RUN OUT BEFORE YOU GOT THE MONEY TO BUY MORE.: NEVER TRUE

## 2024-10-29 SDOH — SOCIAL STABILITY: SOCIAL INSECURITY: ARE THERE ANY APPARENT SIGNS OF INJURIES/BEHAVIORS THAT COULD BE RELATED TO ABUSE/NEGLECT?: NO

## 2024-10-29 SDOH — SOCIAL STABILITY: SOCIAL INSECURITY: WITHIN THE LAST YEAR, HAVE YOU BEEN AFRAID OF YOUR PARTNER OR EX-PARTNER?: NO

## 2024-10-29 SDOH — ECONOMIC STABILITY: INCOME INSECURITY: IN THE PAST 12 MONTHS HAS THE ELECTRIC, GAS, OIL, OR WATER COMPANY THREATENED TO SHUT OFF SERVICES IN YOUR HOME?: NO

## 2024-10-29 SDOH — SOCIAL STABILITY: SOCIAL INSECURITY: WERE YOU ABLE TO COMPLETE ALL THE BEHAVIORAL HEALTH SCREENINGS?: YES

## 2024-10-29 SDOH — SOCIAL STABILITY: SOCIAL INSECURITY: WITHIN THE LAST YEAR, HAVE YOU BEEN HUMILIATED OR EMOTIONALLY ABUSED IN OTHER WAYS BY YOUR PARTNER OR EX-PARTNER?: NO

## 2024-10-29 SDOH — SOCIAL STABILITY: SOCIAL INSECURITY: HAS ANYONE EVER THREATENED TO HURT YOUR FAMILY OR YOUR PETS?: NO

## 2024-10-29 SDOH — SOCIAL STABILITY: SOCIAL INSECURITY: DO YOU FEEL UNSAFE GOING BACK TO THE PLACE WHERE YOU ARE LIVING?: NO

## 2024-10-29 SDOH — SOCIAL STABILITY: SOCIAL INSECURITY: HAVE YOU HAD THOUGHTS OF HARMING ANYONE ELSE?: NO

## 2024-10-29 SDOH — ECONOMIC STABILITY: FOOD INSECURITY: WITHIN THE PAST 12 MONTHS, THE FOOD YOU BOUGHT JUST DIDN'T LAST AND YOU DIDN'T HAVE MONEY TO GET MORE.: NEVER TRUE

## 2024-10-29 SDOH — SOCIAL STABILITY: SOCIAL INSECURITY
WITHIN THE LAST YEAR, HAVE YOU BEEN KICKED, HIT, SLAPPED, OR OTHERWISE PHYSICALLY HURT BY YOUR PARTNER OR EX-PARTNER?: NO

## 2024-10-29 SDOH — SOCIAL STABILITY: SOCIAL INSECURITY: ARE YOU OR HAVE YOU BEEN THREATENED OR ABUSED PHYSICALLY, EMOTIONALLY, OR SEXUALLY BY ANYONE?: NO

## 2024-10-29 SDOH — SOCIAL STABILITY: SOCIAL INSECURITY: HAVE YOU HAD ANY THOUGHTS OF HARMING ANYONE ELSE?: NO

## 2024-10-29 SDOH — SOCIAL STABILITY: SOCIAL INSECURITY
WITHIN THE LAST YEAR, HAVE YOU BEEN RAPED OR FORCED TO HAVE ANY KIND OF SEXUAL ACTIVITY BY YOUR PARTNER OR EX-PARTNER?: NO

## 2024-10-29 SDOH — SOCIAL STABILITY: SOCIAL INSECURITY: DOES ANYONE TRY TO KEEP YOU FROM HAVING/CONTACTING OTHER FRIENDS OR DOING THINGS OUTSIDE YOUR HOME?: NO

## 2024-10-29 SDOH — SOCIAL STABILITY: SOCIAL INSECURITY: DO YOU FEEL ANYONE HAS EXPLOITED OR TAKEN ADVANTAGE OF YOU FINANCIALLY OR OF YOUR PERSONAL PROPERTY?: NO

## 2024-10-29 ASSESSMENT — ACTIVITIES OF DAILY LIVING (ADL)
TOILETING: INDEPENDENT
LACK_OF_TRANSPORTATION: NO
PATIENT'S MEMORY ADEQUATE TO SAFELY COMPLETE DAILY ACTIVITIES?: YES
HEARING - LEFT EAR: FUNCTIONAL
WALKS IN HOME: INDEPENDENT
FEEDING YOURSELF: INDEPENDENT
ADEQUATE_TO_COMPLETE_ADL: YES
BATHING: INDEPENDENT
JUDGMENT_ADEQUATE_SAFELY_COMPLETE_DAILY_ACTIVITIES: YES
DRESSING YOURSELF: INDEPENDENT
GROOMING: INDEPENDENT
ASSISTIVE_DEVICE: EYEGLASSES
HEARING - RIGHT EAR: FUNCTIONAL

## 2024-10-29 ASSESSMENT — PAIN DESCRIPTION - PAIN TYPE: TYPE: ACUTE PAIN

## 2024-10-29 ASSESSMENT — PAIN DESCRIPTION - ORIENTATION
ORIENTATION: ANTERIOR
ORIENTATION: RIGHT
ORIENTATION: ANTERIOR
ORIENTATION: MID

## 2024-10-29 ASSESSMENT — PAIN - FUNCTIONAL ASSESSMENT
PAIN_FUNCTIONAL_ASSESSMENT: 0-10

## 2024-10-29 ASSESSMENT — COGNITIVE AND FUNCTIONAL STATUS - GENERAL
PATIENT BASELINE BEDBOUND: NO
MOBILITY SCORE: 24
DAILY ACTIVITIY SCORE: 24

## 2024-10-29 ASSESSMENT — PAIN SCALES - GENERAL
PAINLEVEL_OUTOF10: 2
PAINLEVEL_OUTOF10: 4
PAINLEVEL_OUTOF10: 5 - MODERATE PAIN
PAINLEVEL_OUTOF10: 0 - NO PAIN
PAINLEVEL_OUTOF10: 0 - NO PAIN
PAINLEVEL_OUTOF10: 6
PAINLEVEL_OUTOF10: 7

## 2024-10-29 ASSESSMENT — LIFESTYLE VARIABLES
AUDIT-C TOTAL SCORE: 0
AUDIT-C TOTAL SCORE: 0
HOW OFTEN DO YOU HAVE 6 OR MORE DRINKS ON ONE OCCASION: NEVER
HOW OFTEN DO YOU HAVE A DRINK CONTAINING ALCOHOL: NEVER
SKIP TO QUESTIONS 9-10: 1
HOW MANY STANDARD DRINKS CONTAINING ALCOHOL DO YOU HAVE ON A TYPICAL DAY: PATIENT DOES NOT DRINK

## 2024-10-29 ASSESSMENT — PAIN DESCRIPTION - LOCATION
LOCATION: HEAD
LOCATION: HEAD
LOCATION: CHEST
LOCATION: ARM

## 2024-10-29 ASSESSMENT — PAIN DESCRIPTION - DESCRIPTORS
DESCRIPTORS: SQUEEZING
DESCRIPTORS: PRESSURE

## 2024-10-29 ASSESSMENT — PATIENT HEALTH QUESTIONNAIRE - PHQ9
SUM OF ALL RESPONSES TO PHQ9 QUESTIONS 1 & 2: 0
1. LITTLE INTEREST OR PLEASURE IN DOING THINGS: NOT AT ALL
2. FEELING DOWN, DEPRESSED OR HOPELESS: NOT AT ALL

## 2024-10-29 ASSESSMENT — COLUMBIA-SUICIDE SEVERITY RATING SCALE - C-SSRS
2. HAVE YOU ACTUALLY HAD ANY THOUGHTS OF KILLING YOURSELF?: NO
1. IN THE PAST MONTH, HAVE YOU WISHED YOU WERE DEAD OR WISHED YOU COULD GO TO SLEEP AND NOT WAKE UP?: NO
6. HAVE YOU EVER DONE ANYTHING, STARTED TO DO ANYTHING, OR PREPARED TO DO ANYTHING TO END YOUR LIFE?: NO

## 2024-10-30 ENCOUNTER — DOCUMENTATION (OUTPATIENT)
Dept: CARDIOLOGY | Facility: HOSPITAL | Age: 34
End: 2024-10-30
Payer: COMMERCIAL

## 2024-10-30 VITALS
HEART RATE: 93 BPM | RESPIRATION RATE: 15 BRPM | SYSTOLIC BLOOD PRESSURE: 119 MMHG | DIASTOLIC BLOOD PRESSURE: 82 MMHG | BODY MASS INDEX: 41.77 KG/M2 | TEMPERATURE: 97.5 F | HEIGHT: 60 IN | WEIGHT: 212.74 LBS | OXYGEN SATURATION: 97 %

## 2024-10-30 LAB
ATRIAL RATE: 71 BPM
B-HCG SERPL-ACNC: <2 MIU/ML
HOLD SPECIMEN: NORMAL
P AXIS: 67 DEGREES
P OFFSET: 186 MS
P ONSET: 155 MS
PR INTERVAL: 140 MS
Q ONSET: 225 MS
QRS COUNT: 11 BEATS
QRS DURATION: 78 MS
QT INTERVAL: 386 MS
QTC CALCULATION(BAZETT): 419 MS
QTC FREDERICIA: 408 MS
R AXIS: 24 DEGREES
T AXIS: 60 DEGREES
T OFFSET: 418 MS
UFH PPP CHRO-ACNC: 0.5 IU/ML
UFH PPP CHRO-ACNC: 0.5 IU/ML
VENTRICULAR RATE: 71 BPM

## 2024-10-30 PROCEDURE — 2500000004 HC RX 250 GENERAL PHARMACY W/ HCPCS (ALT 636 FOR OP/ED): Performed by: STUDENT IN AN ORGANIZED HEALTH CARE EDUCATION/TRAINING PROGRAM

## 2024-10-30 PROCEDURE — 85520 HEPARIN ASSAY: CPT | Performed by: STUDENT IN AN ORGANIZED HEALTH CARE EDUCATION/TRAINING PROGRAM

## 2024-10-30 PROCEDURE — 94762 N-INVAS EAR/PLS OXIMTRY CONT: CPT

## 2024-10-30 PROCEDURE — C1894 INTRO/SHEATH, NON-LASER: HCPCS | Performed by: STUDENT IN AN ORGANIZED HEALTH CARE EDUCATION/TRAINING PROGRAM

## 2024-10-30 PROCEDURE — 05753ZZ DILATION OF RIGHT SUBCLAVIAN VEIN, PERCUTANEOUS APPROACH: ICD-10-PCS | Performed by: STUDENT IN AN ORGANIZED HEALTH CARE EDUCATION/TRAINING PROGRAM

## 2024-10-30 PROCEDURE — S4991 NICOTINE PATCH NONLEGEND: HCPCS | Performed by: STUDENT IN AN ORGANIZED HEALTH CARE EDUCATION/TRAINING PROGRAM

## 2024-10-30 PROCEDURE — C1725 CATH, TRANSLUMIN NON-LASER: HCPCS | Performed by: STUDENT IN AN ORGANIZED HEALTH CARE EDUCATION/TRAINING PROGRAM

## 2024-10-30 PROCEDURE — 86147 CARDIOLIPIN ANTIBODY EA IG: CPT | Mod: SAMLAB | Performed by: STUDENT IN AN ORGANIZED HEALTH CARE EDUCATION/TRAINING PROGRAM

## 2024-10-30 PROCEDURE — 2720000007 HC OR 272 NO HCPCS: Performed by: STUDENT IN AN ORGANIZED HEALTH CARE EDUCATION/TRAINING PROGRAM

## 2024-10-30 PROCEDURE — 99223 1ST HOSP IP/OBS HIGH 75: CPT | Performed by: STUDENT IN AN ORGANIZED HEALTH CARE EDUCATION/TRAINING PROGRAM

## 2024-10-30 PROCEDURE — 81241 F5 GENE: CPT | Performed by: STUDENT IN AN ORGANIZED HEALTH CARE EDUCATION/TRAINING PROGRAM

## 2024-10-30 PROCEDURE — 99153 MOD SED SAME PHYS/QHP EA: CPT | Performed by: STUDENT IN AN ORGANIZED HEALTH CARE EDUCATION/TRAINING PROGRAM

## 2024-10-30 PROCEDURE — C1753 CATH, INTRAVAS ULTRASOUND: HCPCS | Performed by: STUDENT IN AN ORGANIZED HEALTH CARE EDUCATION/TRAINING PROGRAM

## 2024-10-30 PROCEDURE — 05733ZZ DILATION OF RIGHT INNOMINATE VEIN, PERCUTANEOUS APPROACH: ICD-10-PCS | Performed by: STUDENT IN AN ORGANIZED HEALTH CARE EDUCATION/TRAINING PROGRAM

## 2024-10-30 PROCEDURE — C1769 GUIDE WIRE: HCPCS | Performed by: STUDENT IN AN ORGANIZED HEALTH CARE EDUCATION/TRAINING PROGRAM

## 2024-10-30 PROCEDURE — 37252 INTRVASC US NONCORONARY 1ST: CPT | Performed by: STUDENT IN AN ORGANIZED HEALTH CARE EDUCATION/TRAINING PROGRAM

## 2024-10-30 PROCEDURE — 37248 TRLUML BALO ANGIOP 1ST VEIN: CPT | Performed by: STUDENT IN AN ORGANIZED HEALTH CARE EDUCATION/TRAINING PROGRAM

## 2024-10-30 PROCEDURE — 36415 COLL VENOUS BLD VENIPUNCTURE: CPT | Performed by: STUDENT IN AN ORGANIZED HEALTH CARE EDUCATION/TRAINING PROGRAM

## 2024-10-30 PROCEDURE — B51M1ZZ FLUOROSCOPY OF RIGHT UPPER EXTREMITY VEINS USING LOW OSMOLAR CONTRAST: ICD-10-PCS | Performed by: STUDENT IN AN ORGANIZED HEALTH CARE EDUCATION/TRAINING PROGRAM

## 2024-10-30 PROCEDURE — 93005 ELECTROCARDIOGRAM TRACING: CPT

## 2024-10-30 PROCEDURE — 75820 VEIN X-RAY ARM/LEG: CPT | Performed by: STUDENT IN AN ORGANIZED HEALTH CARE EDUCATION/TRAINING PROGRAM

## 2024-10-30 PROCEDURE — 2550000001 HC RX 255 CONTRASTS: Performed by: STUDENT IN AN ORGANIZED HEALTH CARE EDUCATION/TRAINING PROGRAM

## 2024-10-30 PROCEDURE — 99152 MOD SED SAME PHYS/QHP 5/>YRS: CPT | Performed by: STUDENT IN AN ORGANIZED HEALTH CARE EDUCATION/TRAINING PROGRAM

## 2024-10-30 PROCEDURE — 99239 HOSP IP/OBS DSCHRG MGMT >30: CPT | Performed by: HOSPITALIST

## 2024-10-30 PROCEDURE — 2500000002 HC RX 250 W HCPCS SELF ADMINISTERED DRUGS (ALT 637 FOR MEDICARE OP, ALT 636 FOR OP/ED): Performed by: STUDENT IN AN ORGANIZED HEALTH CARE EDUCATION/TRAINING PROGRAM

## 2024-10-30 PROCEDURE — 84702 CHORIONIC GONADOTROPIN TEST: CPT | Performed by: STUDENT IN AN ORGANIZED HEALTH CARE EDUCATION/TRAINING PROGRAM

## 2024-10-30 PROCEDURE — 2500000005 HC RX 250 GENERAL PHARMACY W/O HCPCS: Performed by: STUDENT IN AN ORGANIZED HEALTH CARE EDUCATION/TRAINING PROGRAM

## 2024-10-30 PROCEDURE — B5161ZA FLUOROSCOPY OF RIGHT SUBCLAVIAN VEIN USING LOW OSMOLAR CONTRAST, GUIDANCE: ICD-10-PCS | Performed by: STUDENT IN AN ORGANIZED HEALTH CARE EDUCATION/TRAINING PROGRAM

## 2024-10-30 PROCEDURE — 37187 VENOUS MECH THROMBECTOMY: CPT | Performed by: STUDENT IN AN ORGANIZED HEALTH CARE EDUCATION/TRAINING PROGRAM

## 2024-10-30 PROCEDURE — 85613 RUSSELL VIPER VENOM DILUTED: CPT | Mod: SAMLAB | Performed by: STUDENT IN AN ORGANIZED HEALTH CARE EDUCATION/TRAINING PROGRAM

## 2024-10-30 PROCEDURE — 05C33ZZ EXTIRPATION OF MATTER FROM RIGHT INNOMINATE VEIN, PERCUTANEOUS APPROACH: ICD-10-PCS | Performed by: STUDENT IN AN ORGANIZED HEALTH CARE EDUCATION/TRAINING PROGRAM

## 2024-10-30 PROCEDURE — C1887 CATHETER, GUIDING: HCPCS | Performed by: STUDENT IN AN ORGANIZED HEALTH CARE EDUCATION/TRAINING PROGRAM

## 2024-10-30 RX ORDER — IODIXANOL 320 MG/ML
INJECTION, SOLUTION INTRAVASCULAR AS NEEDED
Status: DISCONTINUED | OUTPATIENT
Start: 2024-10-30 | End: 2024-10-30 | Stop reason: HOSPADM

## 2024-10-30 RX ORDER — MIDAZOLAM HYDROCHLORIDE 1 MG/ML
INJECTION, SOLUTION INTRAMUSCULAR; INTRAVENOUS AS NEEDED
Status: DISCONTINUED | OUTPATIENT
Start: 2024-10-30 | End: 2024-10-30 | Stop reason: HOSPADM

## 2024-10-30 RX ORDER — WATER
400 LIQUID (ML) MISCELLANEOUS ONCE
Status: COMPLETED | OUTPATIENT
Start: 2024-10-30 | End: 2024-10-30

## 2024-10-30 RX ORDER — ENOXAPARIN SODIUM 100 MG/ML
1 INJECTION SUBCUTANEOUS 2 TIMES DAILY
Status: DISCONTINUED | OUTPATIENT
Start: 2024-10-30 | End: 2024-10-30 | Stop reason: HOSPADM

## 2024-10-30 RX ORDER — FENTANYL CITRATE 50 UG/ML
INJECTION, SOLUTION INTRAMUSCULAR; INTRAVENOUS AS NEEDED
Status: DISCONTINUED | OUTPATIENT
Start: 2024-10-30 | End: 2024-10-30 | Stop reason: HOSPADM

## 2024-10-30 RX ORDER — ENOXAPARIN SODIUM 100 MG/ML
1 INJECTION SUBCUTANEOUS 2 TIMES DAILY
Qty: 60 EACH | Refills: 0 | Status: SHIPPED | OUTPATIENT
Start: 2024-10-30

## 2024-10-30 RX ORDER — LIDOCAINE HYDROCHLORIDE 20 MG/ML
INJECTION, SOLUTION INFILTRATION; PERINEURAL AS NEEDED
Status: DISCONTINUED | OUTPATIENT
Start: 2024-10-30 | End: 2024-10-30 | Stop reason: HOSPADM

## 2024-10-30 RX ORDER — HEPARIN SODIUM 1000 [USP'U]/ML
INJECTION, SOLUTION INTRAVENOUS; SUBCUTANEOUS AS NEEDED
Status: DISCONTINUED | OUTPATIENT
Start: 2024-10-30 | End: 2024-10-30 | Stop reason: HOSPADM

## 2024-10-30 RX ORDER — DIPHENHYDRAMINE HYDROCHLORIDE 50 MG/ML
INJECTION INTRAMUSCULAR; INTRAVENOUS AS NEEDED
Status: DISCONTINUED | OUTPATIENT
Start: 2024-10-30 | End: 2024-10-30 | Stop reason: HOSPADM

## 2024-10-30 ASSESSMENT — PAIN SCALES - GENERAL
PAINLEVEL_OUTOF10: 6
PAINLEVEL_OUTOF10: 0 - NO PAIN

## 2024-10-30 ASSESSMENT — PAIN - FUNCTIONAL ASSESSMENT
PAIN_FUNCTIONAL_ASSESSMENT: 0-10

## 2024-10-30 ASSESSMENT — PAIN DESCRIPTION - LOCATION: LOCATION: ARM

## 2024-10-30 ASSESSMENT — ACTIVITIES OF DAILY LIVING (ADL): LACK_OF_TRANSPORTATION: NO

## 2024-10-30 ASSESSMENT — PAIN DESCRIPTION - ORIENTATION: ORIENTATION: RIGHT

## 2024-10-31 ENCOUNTER — PATIENT OUTREACH (OUTPATIENT)
Dept: CARDIOLOGY | Facility: CLINIC | Age: 34
End: 2024-10-31
Payer: COMMERCIAL

## 2024-10-31 ENCOUNTER — TELEPHONE (OUTPATIENT)
Dept: VASCULAR SURGERY | Facility: CLINIC | Age: 34
End: 2024-10-31
Payer: COMMERCIAL

## 2024-10-31 LAB
CARDIOLIPIN IGA SERPL-ACNC: 1.8 APL U/ML
CARDIOLIPIN IGG SER IA-ACNC: <1.6 GPL U/ML
CARDIOLIPIN IGM SER IA-ACNC: 0.6 MPL U/ML
DRVVT SCREEN TO CONFIRM RATIO: 0.7 RATIO
DRVVT/DRVVT CFM NRMLZD PPP-RTO: 0.87 RATIO
DRVVT/DRVVT CFM P DOAC NEUT NORM PPP-RTO: 0.8 RATIO
LA 2 SCREEN W REFLEX-IMP: NORMAL
NORMALIZED SCT PPP-RTO: 0.61 RATIO
SILICA CLOTTING TIME CONFIRMATION: 0.96 RATIO
SILICA CLOTTING TIME SCREEN: 0.58 RATIO

## 2024-11-01 ENCOUNTER — TELEPHONE (OUTPATIENT)
Dept: VASCULAR SURGERY | Facility: CLINIC | Age: 34
End: 2024-11-01
Payer: COMMERCIAL

## 2024-11-04 LAB
ELECTRONICALLY SIGNED BY: NORMAL
FACTOR V LEIDEN INTERPRETATION: NORMAL
FACTOR V LEIDEN RESULT: NORMAL

## 2024-11-08 ENCOUNTER — TELEPHONE (OUTPATIENT)
Dept: VASCULAR SURGERY | Facility: HOSPITAL | Age: 34
End: 2024-11-08
Payer: COMMERCIAL

## 2024-11-08 NOTE — TELEPHONE ENCOUNTER
I have attempted to contact    Ms Menendez   . There is no answer at the following phone number   [phone]  . I have left a voice mail message for the patient to contact Dr. Stuart  office nurse at [phone] to reschedule her appointment.  Madisyn Ingram RN BSN

## 2024-11-12 ENCOUNTER — PATIENT OUTREACH (OUTPATIENT)
Dept: CARDIOLOGY | Facility: CLINIC | Age: 34
End: 2024-11-12
Payer: COMMERCIAL

## 2024-11-22 ENCOUNTER — APPOINTMENT (OUTPATIENT)
Dept: VASCULAR MEDICINE | Facility: HOSPITAL | Age: 34
End: 2024-11-22
Payer: COMMERCIAL

## 2024-11-26 ENCOUNTER — HOSPITAL ENCOUNTER (EMERGENCY)
Facility: HOSPITAL | Age: 34
Discharge: HOME | End: 2024-11-26
Attending: EMERGENCY MEDICINE
Payer: COMMERCIAL

## 2024-11-26 ENCOUNTER — APPOINTMENT (OUTPATIENT)
Dept: RADIOLOGY | Facility: HOSPITAL | Age: 34
End: 2024-11-26
Payer: COMMERCIAL

## 2024-11-26 VITALS
BODY MASS INDEX: 42.21 KG/M2 | TEMPERATURE: 98.1 F | OXYGEN SATURATION: 94 % | SYSTOLIC BLOOD PRESSURE: 115 MMHG | DIASTOLIC BLOOD PRESSURE: 65 MMHG | RESPIRATION RATE: 18 BRPM | HEIGHT: 60 IN | HEART RATE: 100 BPM | WEIGHT: 215 LBS

## 2024-11-26 DIAGNOSIS — J02.9 ACUTE PHARYNGITIS, UNSPECIFIED ETIOLOGY: Primary | ICD-10-CM

## 2024-11-26 LAB — S PYO DNA THROAT QL NAA+PROBE: NOT DETECTED

## 2024-11-26 PROCEDURE — 99283 EMERGENCY DEPT VISIT LOW MDM: CPT

## 2024-11-26 PROCEDURE — 87651 STREP A DNA AMP PROBE: CPT | Performed by: EMERGENCY MEDICINE

## 2024-11-26 PROCEDURE — 2500000001 HC RX 250 WO HCPCS SELF ADMINISTERED DRUGS (ALT 637 FOR MEDICARE OP): Performed by: EMERGENCY MEDICINE

## 2024-11-26 PROCEDURE — 2500000004 HC RX 250 GENERAL PHARMACY W/ HCPCS (ALT 636 FOR OP/ED): Performed by: EMERGENCY MEDICINE

## 2024-11-26 RX ORDER — IBUPROFEN 400 MG/1
400 TABLET ORAL ONCE
Status: COMPLETED | OUTPATIENT
Start: 2024-11-26 | End: 2024-11-26

## 2024-11-26 RX ORDER — DEXAMETHASONE 4 MG/1
12 TABLET ORAL ONCE
Status: COMPLETED | OUTPATIENT
Start: 2024-11-26 | End: 2024-11-26

## 2024-11-26 RX ORDER — ACETAMINOPHEN 325 MG/1
975 TABLET ORAL ONCE
Status: COMPLETED | OUTPATIENT
Start: 2024-11-26 | End: 2024-11-26

## 2024-11-26 ASSESSMENT — COLUMBIA-SUICIDE SEVERITY RATING SCALE - C-SSRS
1. IN THE PAST MONTH, HAVE YOU WISHED YOU WERE DEAD OR WISHED YOU COULD GO TO SLEEP AND NOT WAKE UP?: NO
2. HAVE YOU ACTUALLY HAD ANY THOUGHTS OF KILLING YOURSELF?: NO
6. HAVE YOU EVER DONE ANYTHING, STARTED TO DO ANYTHING, OR PREPARED TO DO ANYTHING TO END YOUR LIFE?: NO

## 2024-11-26 ASSESSMENT — PAIN - FUNCTIONAL ASSESSMENT: PAIN_FUNCTIONAL_ASSESSMENT: 0-10

## 2024-11-26 ASSESSMENT — PAIN DESCRIPTION - PROGRESSION: CLINICAL_PROGRESSION: NOT CHANGED

## 2024-11-26 ASSESSMENT — PAIN DESCRIPTION - PAIN TYPE: TYPE: ACUTE PAIN

## 2024-11-26 ASSESSMENT — PAIN DESCRIPTION - LOCATION: LOCATION: THROAT

## 2024-11-26 ASSESSMENT — PAIN SCALES - GENERAL: PAINLEVEL_OUTOF10: 9

## 2024-11-26 NOTE — ED PROVIDER NOTES
"34-year-old female presents with a chief complaint of sore throat.  Has been going on for 1 day.  Had to use some excessive cleaning solution at work.  Also reports a cough and a \"rattle \"while breathing.  No fever or chills.  No chest pain.         Review of Systems     Physical Exam  Vitals and nursing note reviewed.   Constitutional:       General: She is not in acute distress.     Appearance: She is well-developed.   HENT:      Head: Normocephalic and atraumatic.      Mouth/Throat:      Pharynx: Posterior oropharyngeal erythema present. No oropharyngeal exudate or uvula swelling.   Eyes:      Conjunctiva/sclera: Conjunctivae normal.   Cardiovascular:      Rate and Rhythm: Normal rate and regular rhythm.      Heart sounds: No murmur heard.  Pulmonary:      Effort: Pulmonary effort is normal. No respiratory distress.      Breath sounds: Normal breath sounds.   Abdominal:      Palpations: Abdomen is soft.      Tenderness: There is no abdominal tenderness.   Musculoskeletal:         General: No swelling.      Cervical back: Neck supple.   Skin:     General: Skin is warm and dry.      Capillary Refill: Capillary refill takes less than 2 seconds.   Neurological:      Mental Status: She is alert.   Psychiatric:         Mood and Affect: Mood normal.          Labs Reviewed   GROUP A STREPTOCOCCUS, PCR - Normal       Result Value    Group A Strep PCR Not Detected          XR chest 2 views    (Results Pending)        Procedures     Medical Decision Making  34-year-old female presents with a chief complaint of sore throat.  Has been going on for 1 day.  Had to use some excessive cleaning solution at work.  Also reports a cough and a \"rattle \"while breathing.  No fever or chills.  No chest pain.  Patient with some posterior Thieme on exam.  Rapid test was negative for strep pharyngitis.  Patient was treated with 400 mg p.o. Motrin, 975 p.o. Tylenol and 12 mg p.o. dexamethasone.  Chest x-ray had been ordered however the " patient declined and wanted to leave.  Patient without any breathing or swallowing issues.    DDx: Chemical irritant, strep pharyngitis, viral pharyngitis, pneumonia         Diagnoses as of 11/26/24 0413   Acute pharyngitis, unspecified etiology                    Harsh Milian MD  11/26/24 0413

## 2024-11-27 ENCOUNTER — APPOINTMENT (OUTPATIENT)
Dept: CARDIOLOGY | Facility: CLINIC | Age: 34
End: 2024-11-27
Payer: COMMERCIAL

## 2024-11-27 VITALS
HEART RATE: 91 BPM | HEIGHT: 60 IN | SYSTOLIC BLOOD PRESSURE: 134 MMHG | OXYGEN SATURATION: 99 % | DIASTOLIC BLOOD PRESSURE: 84 MMHG | WEIGHT: 215 LBS | BODY MASS INDEX: 42.21 KG/M2

## 2024-11-27 DIAGNOSIS — G54.0 THORACIC OUTLET SYNDROME: Primary | ICD-10-CM

## 2024-11-27 DIAGNOSIS — I82.A11 ACUTE DEEP VEIN THROMBOSIS (DVT) OF AXILLARY VEIN OF RIGHT UPPER EXTREMITY (MULTI): ICD-10-CM

## 2024-11-27 PROCEDURE — 3008F BODY MASS INDEX DOCD: CPT | Performed by: STUDENT IN AN ORGANIZED HEALTH CARE EDUCATION/TRAINING PROGRAM

## 2024-11-27 PROCEDURE — 99214 OFFICE O/P EST MOD 30 MIN: CPT | Performed by: STUDENT IN AN ORGANIZED HEALTH CARE EDUCATION/TRAINING PROGRAM

## 2024-11-27 RX ORDER — ENOXAPARIN SODIUM 100 MG/ML
1 INJECTION SUBCUTANEOUS 2 TIMES DAILY
Qty: 60 EACH | Refills: 1 | Status: SHIPPED | OUTPATIENT
Start: 2024-11-27 | End: 2025-01-26

## 2024-11-27 NOTE — PROGRESS NOTES
Referred by Dimitry Pike MD  Chief complaint:   Chief Complaint   Patient presents with    Thromboectomy     NPV        History of Present Illness  Vi Menendez is a 34 y.o. year old female patient with no known prior medical history who is presenting for follow-up after recent hospital admission with unprovoked right upper extremity DVT status post thrombectomy.  Patient underwent successful thrombectomy of right upper extremity and angioplasty of right subclavian vein.  She was referred for evaluation with vascular surgery with Dr. Andrews at [place], however patient did not follow-up.  She did not get postprocedural ultrasound either.  She was discharged on Lovenox.  Reports compliance with Lovenox injections twice a day.  Denies any bleeding issues.  Mild right upper extremity swelling, prominent varicose veins in the right upper  She reports her right arm swelling has improved, however continues to have some discomfort and swelling in the right upper chest and shoulder.      Social History     Tobacco Use    Smoking status: Every Day     Current packs/day: 1.00     Average packs/day: 1 pack/day for 10.2 years (10.2 ttl pk-yrs)     Types: Cigarettes     Start date: 10/2014    Smokeless tobacco: Never   Substance Use Topics    Alcohol use: Not Currently    Drug use: Never       Outpatient Medications:  Current Outpatient Medications   Medication Instructions    enoxaparin (LOVENOX) 1 mg/kg, subcutaneous, 2 times daily         Vitals:  Vitals:    11/27/24 1310   BP: 134/84   Pulse: 91   SpO2: 99%       Physical Exam:  General: NAD, well-appearing  HEENT: moist mucous membranes, no jaundice  Neck: No JVD, no carotid bruit  Lungs: CTA brandon, no wheezing or rales  Cardiac: RRR, no murmurs  Abdomen: soft, non-tender, non-distended  Extremities: 2+ radial pulses, mild right upper extremity swelling, prominent venous collaterals in the right arm  Skin: warm, dry, no wound  Neurologic: AAOx3,  no focal  deficits        Assessment/Plan     # Spontaneous right upper extremity DVT, Paget Schroetter syndrome, thoracic outlet syndrome  -Status post right upper extremity thrombectomy and venoplasty of right subclavian vein  -Still pending evaluation with vascular surgery for rib resection  -We have scheduled her follow-up next week with Dr. Andrews  -Will obtain right upper extremity ultrasound to evaluate patency of vein  -Continue Lovenox injections twice daily.  Reinforced importance of compliance with patient      Pepe Ortez MD [facility]  Interventional Cardiology  Endovascular Interventions  [email or web address]    **Disclaimer: This note was dictated by speech recognition, and every effort has been made to prevent any error in transcription, however minor errors may be present**

## 2024-11-29 ENCOUNTER — APPOINTMENT (OUTPATIENT)
Dept: VASCULAR MEDICINE | Facility: HOSPITAL | Age: 34
End: 2024-11-29
Payer: COMMERCIAL

## 2024-12-02 ENCOUNTER — APPOINTMENT (OUTPATIENT)
Dept: VASCULAR MEDICINE | Facility: HOSPITAL | Age: 34
End: 2024-12-02
Payer: COMMERCIAL

## 2024-12-04 ENCOUNTER — APPOINTMENT (OUTPATIENT)
Dept: CARDIOLOGY | Facility: CLINIC | Age: 34
End: 2024-12-04
Payer: COMMERCIAL

## 2024-12-05 ENCOUNTER — TELEPHONE (OUTPATIENT)
Dept: VASCULAR SURGERY | Facility: HOSPITAL | Age: 34
End: 2024-12-05

## 2024-12-05 ENCOUNTER — OFFICE VISIT (OUTPATIENT)
Dept: VASCULAR SURGERY | Facility: CLINIC | Age: 34
End: 2024-12-05
Payer: COMMERCIAL

## 2024-12-05 ENCOUNTER — HOSPITAL ENCOUNTER (OUTPATIENT)
Facility: HOSPITAL | Age: 34
Setting detail: SURGERY ADMIT
End: 2024-12-05
Attending: SURGERY | Admitting: SURGERY
Payer: COMMERCIAL

## 2024-12-05 VITALS
HEIGHT: 60 IN | BODY MASS INDEX: 40.64 KG/M2 | SYSTOLIC BLOOD PRESSURE: 139 MMHG | DIASTOLIC BLOOD PRESSURE: 84 MMHG | TEMPERATURE: 96.8 F | WEIGHT: 207 LBS | RESPIRATION RATE: 16 BRPM | HEART RATE: 85 BPM

## 2024-12-05 DIAGNOSIS — I82.A11 ACUTE DEEP VEIN THROMBOSIS (DVT) OF AXILLARY VEIN OF RIGHT UPPER EXTREMITY (MULTI): Primary | ICD-10-CM

## 2024-12-05 DIAGNOSIS — I82.621 DEEP VEIN THROMBOSIS (DVT) OF RIGHT UPPER EXTREMITY, UNSPECIFIED CHRONICITY, UNSPECIFIED VEIN (MULTI): ICD-10-CM

## 2024-12-05 DIAGNOSIS — Z01.818 PRE-OPERATIVE EXAM: ICD-10-CM

## 2024-12-05 PROCEDURE — 99205 OFFICE O/P NEW HI 60 MIN: CPT | Performed by: SURGERY

## 2024-12-05 PROCEDURE — 3008F BODY MASS INDEX DOCD: CPT | Performed by: SURGERY

## 2024-12-05 PROCEDURE — 99215 OFFICE O/P EST HI 40 MIN: CPT | Performed by: SURGERY

## 2024-12-05 NOTE — PROGRESS NOTES
Vascular Surgery Clinic Note    CC: vTOS    HPI:  Vi Menendez is 34 y.o. female with history of right subclavian DVT s/p thrombectomy by Dr. Ortez on 10/30/24. She has been maintained on lovenox. She says her arm is still a little bigger than the other side but swelling improved a lot. She has some pains in the upper arm/chest area. She is left handed. No prior medical issues. She has had  in the past.     Medical History:   has no past medical history on file.    Meds:   Current Outpatient Medications on File Prior to Visit   Medication Sig Dispense Refill    enoxaparin (Lovenox) 100 mg/mL syringe Inject 1 mL (100 mg) under the skin 2 times a day. 60 each 1     No current facility-administered medications on file prior to visit.        Allergies:   Allergies   Allergen Reactions    Penicillin Swelling     tongue swelling       SH:    Social Drivers of Health     Tobacco Use: High Risk (2024)    Patient History     Smoking Tobacco Use: Every Day     Smokeless Tobacco Use: Never     Passive Exposure: Not on file   Alcohol Use: Not At Risk (10/29/2024)    AUDIT-C     Frequency of Alcohol Consumption: Never     Average Number of Drinks: Patient does not drink     Frequency of Binge Drinking: Never   Financial Resource Strain: Low Risk  (10/30/2024)    Overall Financial Resource Strain (CARDIA)     Difficulty of Paying Living Expenses: Not hard at all   Food Insecurity: No Food Insecurity (10/29/2024)    Hunger Vital Sign     Worried About Running Out of Food in the Last Year: Never true     Ran Out of Food in the Last Year: Never true   Transportation Needs: No Transportation Needs (10/30/2024)    PRAPARE - Transportation     Lack of Transportation (Medical): No     Lack of Transportation (Non-Medical): No   Physical Activity: Not on file   Stress: Not on file   Social Connections: Not on file   Intimate Partner Violence: Not At Risk (10/29/2024)    Humiliation, Afraid, Rape, and Kick questionnaire      Fear of Current or Ex-Partner: No     Emotionally Abused: No     Physically Abused: No     Sexually Abused: No   Depression: Not at risk (10/29/2024)    PHQ-2     PHQ-2 Score: 0   Housing Stability: Low Risk  (10/30/2024)    Housing Stability Vital Sign     Unable to Pay for Housing in the Last Year: No     Number of Times Moved in the Last Year: 0     Homeless in the Last Year: No   Utilities: Not At Risk (10/29/2024)    [facility] Utilities     Threatened with loss of utilities: No   Digital Equity: Not on file   Health Literacy: Not on file        FH:  Family History   Problem Relation Name Age of Onset    Stroke Maternal Grandfather      Heart attack Maternal Grandfather          ROS:  All systems were reviewed and are negative except as per HPI.    Objective:  Vitals:  Vitals:    12/05/24 1444   BP: 139/84   Pulse: 85   Resp: 16   Temp: 36 °C (96.8 °F)        Exam:  In NAD, well appearing  Abd Soft, ND/NT  Vascular examination:  Right arm slightly larger than left, no edema, some prominent chest collaterals    Assessment & Plan:  Vi Menendez is 34 y.o. female with recent right subclavian DVT, s/p thrombectomy, on lovenox. I will obtain a RUE duplex to check for vein patency. She is shceduled for right first rib resection 12/16/24 with intraoperative venogram.      I spent a total of 60 minutes on the day of the visit.         Tung Andrews M.D.

## 2024-12-05 NOTE — TELEPHONE ENCOUNTER
Dear Ms. Menendez      Following review with Dr. Tung Andrews, you have been scheduled for a vascular surgery on 12/16/2024_at  [facility]      Prior to your surgery please obtain the following blood work (orders entered into the electronic medical record) at least  one week before the planned operation..Blood work must be completed in the hospital lab, located on the first floor of [facility] near registration.       The department of preadmission testing at [facility] will contact you via phone to review your health history and current medications before the planed surgery.      Per Dr. Andrews's  request , please hold the following medications Lovenox, evening prior to the procedure  ( 12/15/2024)  and  the morning of your procedure 12/16/2024.                Please remember nothing to eat nor drink after 12 midnight.   Please wear comfortable clothes and remember to bring with you your insurance cards, a form of identification and your COVID vaccination card with you.   Do not bring valuables such as credit cards, checkbook money or jewelry with you.     PLEASE BRING ALL MEDICATIONS OR AN UPDATED LIST OF CURRENT MEDICATIONS WITH YOU    You must have a  on the day of the procedure.   Directions to the medical center have been provided     If you become ill  ( I.e. cold, flu symptoms, fever or rash) before your scheduled procedure  please notify our office.     Your arrival time is not your procedure time. Please remember  that changes frequently  occur in the schedule due to emergencies and extended length of procedures.   Your patience is appreciated.       The patient has verbalized an understanding of the instructions.   If you have any questions or concerns, please feel free to contact our office at [phone].    Madisyn Ingram RN BSN  Vascular and Endovascular Surgery

## 2024-12-06 NOTE — PREPROCEDURE INSTRUCTIONS

## 2024-12-09 ENCOUNTER — HOSPITAL ENCOUNTER (INPATIENT)
Facility: HOSPITAL | Age: 34
End: 2024-12-09
Attending: HOSPITALIST | Admitting: HOSPITALIST
Payer: COMMERCIAL

## 2024-12-09 ENCOUNTER — LAB REQUISITION (OUTPATIENT)
Dept: LAB | Facility: HOSPITAL | Age: 34
End: 2024-12-09
Payer: COMMERCIAL

## 2024-12-09 ENCOUNTER — LAB (OUTPATIENT)
Dept: LAB | Facility: LAB | Age: 34
End: 2024-12-09
Payer: COMMERCIAL

## 2024-12-09 ENCOUNTER — HOSPITAL ENCOUNTER (OUTPATIENT)
Dept: CARDIOLOGY | Facility: HOSPITAL | Age: 34
Discharge: HOME | End: 2024-12-09
Payer: COMMERCIAL

## 2024-12-09 ENCOUNTER — HOSPITAL ENCOUNTER (EMERGENCY)
Facility: HOSPITAL | Age: 34
Discharge: LEFT BEFORE DISCHARGE | End: 2024-12-09
Attending: EMERGENCY MEDICINE
Payer: COMMERCIAL

## 2024-12-09 ENCOUNTER — APPOINTMENT (OUTPATIENT)
Dept: RADIOLOGY | Facility: HOSPITAL | Age: 34
End: 2024-12-09
Payer: COMMERCIAL

## 2024-12-09 ENCOUNTER — APPOINTMENT (OUTPATIENT)
Dept: VASCULAR MEDICINE | Facility: HOSPITAL | Age: 34
End: 2024-12-09
Payer: COMMERCIAL

## 2024-12-09 ENCOUNTER — HOSPITAL ENCOUNTER (OUTPATIENT)
Dept: VASCULAR MEDICINE | Facility: HOSPITAL | Age: 34
Discharge: HOME | End: 2024-12-09
Payer: COMMERCIAL

## 2024-12-09 VITALS
RESPIRATION RATE: 16 BRPM | WEIGHT: 210 LBS | HEART RATE: 71 BPM | BODY MASS INDEX: 41.23 KG/M2 | TEMPERATURE: 97.7 F | HEIGHT: 60 IN | SYSTOLIC BLOOD PRESSURE: 122 MMHG | DIASTOLIC BLOOD PRESSURE: 81 MMHG | OXYGEN SATURATION: 96 %

## 2024-12-09 DIAGNOSIS — N39.0 BACTERIAL UTI: ICD-10-CM

## 2024-12-09 DIAGNOSIS — I82.A11: ICD-10-CM

## 2024-12-09 DIAGNOSIS — J18.9 MULTIFOCAL PNEUMONIA: ICD-10-CM

## 2024-12-09 DIAGNOSIS — I82.621 DEEP VEIN THROMBOSIS (DVT) OF RIGHT UPPER EXTREMITY, UNSPECIFIED CHRONICITY, UNSPECIFIED VEIN (MULTI): ICD-10-CM

## 2024-12-09 DIAGNOSIS — I82.A11 ACUTE DEEP VEIN THROMBOSIS (DVT) OF AXILLARY VEIN OF RIGHT UPPER EXTREMITY (MULTI): ICD-10-CM

## 2024-12-09 DIAGNOSIS — I82.B11 SUBCLAVIAN VEIN THROMBOSIS, RIGHT: ICD-10-CM

## 2024-12-09 DIAGNOSIS — M79.89 OTHER SPECIFIED SOFT TISSUE DISORDERS: ICD-10-CM

## 2024-12-09 DIAGNOSIS — Z01.818 PRE-OPERATIVE EXAM: ICD-10-CM

## 2024-12-09 DIAGNOSIS — A49.9 BACTERIAL UTI: ICD-10-CM

## 2024-12-09 DIAGNOSIS — I82.A11 DVT OF AXILLARY VEIN, ACUTE RIGHT (MULTI): Primary | ICD-10-CM

## 2024-12-09 LAB
ALBUMIN SERPL BCP-MCNC: 3.5 G/DL (ref 3.4–5)
ALP SERPL-CCNC: 65 U/L (ref 33–110)
ALT SERPL W P-5'-P-CCNC: 26 U/L (ref 7–45)
ANION GAP SERPL CALC-SCNC: 8 MMOL/L (ref 10–20)
ANION GAP SERPL CALC-SCNC: 9 MMOL/L (ref 10–20)
APPEARANCE UR: ABNORMAL
APTT PPP: 34 SECONDS (ref 27–38)
AST SERPL W P-5'-P-CCNC: 14 U/L (ref 9–39)
BACTERIA #/AREA URNS AUTO: ABNORMAL /HPF
BASOPHILS # BLD AUTO: 0.02 X10*3/UL (ref 0–0.1)
BASOPHILS # BLD AUTO: 0.03 X10*3/UL (ref 0–0.1)
BASOPHILS NFR BLD AUTO: 0.1 %
BASOPHILS NFR BLD AUTO: 0.2 %
BILIRUB DIRECT SERPL-MCNC: 0 MG/DL (ref 0–0.3)
BILIRUB SERPL-MCNC: 0.2 MG/DL (ref 0–1.2)
BILIRUB UR STRIP.AUTO-MCNC: NEGATIVE MG/DL
BNP SERPL-MCNC: 41 PG/ML (ref 0–99)
BUN SERPL-MCNC: 15 MG/DL (ref 6–23)
BUN SERPL-MCNC: 20 MG/DL (ref 6–23)
CALCIUM SERPL-MCNC: 8.4 MG/DL (ref 8.6–10.3)
CALCIUM SERPL-MCNC: 8.4 MG/DL (ref 8.6–10.3)
CARDIAC TROPONIN I PNL SERPL HS: 4 NG/L (ref 0–13)
CHLORIDE SERPL-SCNC: 104 MMOL/L (ref 98–107)
CHLORIDE SERPL-SCNC: 106 MMOL/L (ref 98–107)
CO2 SERPL-SCNC: 26 MMOL/L (ref 21–32)
CO2 SERPL-SCNC: 26 MMOL/L (ref 21–32)
COLOR UR: ABNORMAL
CREAT SERPL-MCNC: 0.42 MG/DL (ref 0.5–1.05)
CREAT SERPL-MCNC: 0.49 MG/DL (ref 0.5–1.05)
D DIMER PPP FEU-MCNC: 651 NG/ML FEU
EGFRCR SERPLBLD CKD-EPI 2021: >90 ML/MIN/1.73M*2
EGFRCR SERPLBLD CKD-EPI 2021: >90 ML/MIN/1.73M*2
EOSINOPHIL # BLD AUTO: 0.38 X10*3/UL (ref 0–0.7)
EOSINOPHIL # BLD AUTO: 0.43 X10*3/UL (ref 0–0.7)
EOSINOPHIL NFR BLD AUTO: 2.3 %
EOSINOPHIL NFR BLD AUTO: 3 %
ERYTHROCYTE [DISTWIDTH] IN BLOOD BY AUTOMATED COUNT: 12.8 % (ref 11.5–14.5)
ERYTHROCYTE [DISTWIDTH] IN BLOOD BY AUTOMATED COUNT: 12.9 % (ref 11.5–14.5)
FLUAV RNA RESP QL NAA+PROBE: NOT DETECTED
FLUBV RNA RESP QL NAA+PROBE: NOT DETECTED
GLUCOSE SERPL-MCNC: 80 MG/DL (ref 74–99)
GLUCOSE SERPL-MCNC: 99 MG/DL (ref 74–99)
GLUCOSE UR STRIP.AUTO-MCNC: NORMAL MG/DL
HCT VFR BLD AUTO: 32.1 % (ref 36–46)
HCT VFR BLD AUTO: 35.8 % (ref 36–46)
HGB BLD-MCNC: 10.6 G/DL (ref 12–16)
HGB BLD-MCNC: 11.3 G/DL (ref 12–16)
IMM GRANULOCYTES # BLD AUTO: 0.05 X10*3/UL (ref 0–0.7)
IMM GRANULOCYTES # BLD AUTO: 0.08 X10*3/UL (ref 0–0.7)
IMM GRANULOCYTES NFR BLD AUTO: 0.4 % (ref 0–0.9)
IMM GRANULOCYTES NFR BLD AUTO: 0.5 % (ref 0–0.9)
INR PPP: 1 (ref 0.9–1.1)
KETONES UR STRIP.AUTO-MCNC: NEGATIVE MG/DL
LACTATE SERPL-SCNC: 0.8 MMOL/L (ref 0.4–2)
LEUKOCYTE ESTERASE UR QL STRIP.AUTO: ABNORMAL
LIPASE SERPL-CCNC: 26 U/L (ref 9–82)
LYMPHOCYTES # BLD AUTO: 2.29 X10*3/UL (ref 1.2–4.8)
LYMPHOCYTES # BLD AUTO: 2.31 X10*3/UL (ref 1.2–4.8)
LYMPHOCYTES NFR BLD AUTO: 14.1 %
LYMPHOCYTES NFR BLD AUTO: 16.3 %
MAGNESIUM SERPL-MCNC: 1.77 MG/DL (ref 1.6–2.4)
MCH RBC QN AUTO: 27.2 PG (ref 26–34)
MCH RBC QN AUTO: 28 PG (ref 26–34)
MCHC RBC AUTO-ENTMCNC: 31.6 G/DL (ref 32–36)
MCHC RBC AUTO-ENTMCNC: 33 G/DL (ref 32–36)
MCV RBC AUTO: 85 FL (ref 80–100)
MCV RBC AUTO: 86 FL (ref 80–100)
MONOCYTES # BLD AUTO: 0.87 X10*3/UL (ref 0.1–1)
MONOCYTES # BLD AUTO: 0.94 X10*3/UL (ref 0.1–1)
MONOCYTES NFR BLD AUTO: 5.8 %
MONOCYTES NFR BLD AUTO: 6.1 %
MUCOUS THREADS #/AREA URNS AUTO: ABNORMAL /LPF
NEUTROPHILS # BLD AUTO: 10.49 X10*3/UL (ref 1.2–7.7)
NEUTROPHILS # BLD AUTO: 12.5 X10*3/UL (ref 1.2–7.7)
NEUTROPHILS NFR BLD AUTO: 74.1 %
NEUTROPHILS NFR BLD AUTO: 77.1 %
NITRITE UR QL STRIP.AUTO: NEGATIVE
NRBC BLD-RTO: 0 /100 WBCS (ref 0–0)
NRBC BLD-RTO: 0 /100 WBCS (ref 0–0)
PH UR STRIP.AUTO: 6.5 [PH]
PLATELET # BLD AUTO: 325 X10*3/UL (ref 150–450)
PLATELET # BLD AUTO: 370 X10*3/UL (ref 150–450)
POTASSIUM SERPL-SCNC: 3.8 MMOL/L (ref 3.5–5.3)
POTASSIUM SERPL-SCNC: 4.2 MMOL/L (ref 3.5–5.3)
PROT SERPL-MCNC: 6.5 G/DL (ref 6.4–8.2)
PROT UR STRIP.AUTO-MCNC: ABNORMAL MG/DL
PROTHROMBIN TIME: 11.8 SECONDS (ref 9.8–12.8)
RBC # BLD AUTO: 3.79 X10*6/UL (ref 4–5.2)
RBC # BLD AUTO: 4.16 X10*6/UL (ref 4–5.2)
RBC # UR STRIP.AUTO: ABNORMAL /UL
RBC #/AREA URNS AUTO: ABNORMAL /HPF
SARS-COV-2 RNA RESP QL NAA+PROBE: NOT DETECTED
SODIUM SERPL-SCNC: 134 MMOL/L (ref 136–145)
SODIUM SERPL-SCNC: 137 MMOL/L (ref 136–145)
SP GR UR STRIP.AUTO: 1.03
SQUAMOUS #/AREA URNS AUTO: ABNORMAL /HPF
UROBILINOGEN UR STRIP.AUTO-MCNC: NORMAL MG/DL
WBC # BLD AUTO: 14.2 X10*3/UL (ref 4.4–11.3)
WBC # BLD AUTO: 16.2 X10*3/UL (ref 4.4–11.3)
WBC #/AREA URNS AUTO: ABNORMAL /HPF

## 2024-12-09 PROCEDURE — 2500000004 HC RX 250 GENERAL PHARMACY W/ HCPCS (ALT 636 FOR OP/ED): Performed by: EMERGENCY MEDICINE

## 2024-12-09 PROCEDURE — 94664 DEMO&/EVAL PT USE INHALER: CPT

## 2024-12-09 PROCEDURE — 83690 ASSAY OF LIPASE: CPT | Performed by: EMERGENCY MEDICINE

## 2024-12-09 PROCEDURE — 2500000002 HC RX 250 W HCPCS SELF ADMINISTERED DRUGS (ALT 637 FOR MEDICARE OP, ALT 636 FOR OP/ED): Performed by: EMERGENCY MEDICINE

## 2024-12-09 PROCEDURE — 85379 FIBRIN DEGRADATION QUANT: CPT | Performed by: EMERGENCY MEDICINE

## 2024-12-09 PROCEDURE — 36415 COLL VENOUS BLD VENIPUNCTURE: CPT | Performed by: EMERGENCY MEDICINE

## 2024-12-09 PROCEDURE — 96365 THER/PROPH/DIAG IV INF INIT: CPT | Mod: 59

## 2024-12-09 PROCEDURE — 71275 CT ANGIOGRAPHY CHEST: CPT

## 2024-12-09 PROCEDURE — 99285 EMERGENCY DEPT VISIT HI MDM: CPT | Mod: 25 | Performed by: EMERGENCY MEDICINE

## 2024-12-09 PROCEDURE — 87086 URINE CULTURE/COLONY COUNT: CPT | Mod: SAMLAB | Performed by: EMERGENCY MEDICINE

## 2024-12-09 PROCEDURE — 86901 BLOOD TYPING SEROLOGIC RH(D): CPT

## 2024-12-09 PROCEDURE — 93971 EXTREMITY STUDY: CPT | Performed by: STUDENT IN AN ORGANIZED HEALTH CARE EDUCATION/TRAINING PROGRAM

## 2024-12-09 PROCEDURE — 94640 AIRWAY INHALATION TREATMENT: CPT

## 2024-12-09 PROCEDURE — 86900 BLOOD TYPING SEROLOGIC ABO: CPT

## 2024-12-09 PROCEDURE — 83735 ASSAY OF MAGNESIUM: CPT | Performed by: EMERGENCY MEDICINE

## 2024-12-09 PROCEDURE — 9420000001 HC RT PATIENT EDUCATION 5 MIN

## 2024-12-09 PROCEDURE — 85025 COMPLETE CBC W/AUTO DIFF WBC: CPT | Performed by: EMERGENCY MEDICINE

## 2024-12-09 PROCEDURE — 85610 PROTHROMBIN TIME: CPT | Performed by: EMERGENCY MEDICINE

## 2024-12-09 PROCEDURE — 85730 THROMBOPLASTIN TIME PARTIAL: CPT | Performed by: EMERGENCY MEDICINE

## 2024-12-09 PROCEDURE — 83880 ASSAY OF NATRIURETIC PEPTIDE: CPT | Performed by: EMERGENCY MEDICINE

## 2024-12-09 PROCEDURE — 93005 ELECTROCARDIOGRAM TRACING: CPT

## 2024-12-09 PROCEDURE — 83605 ASSAY OF LACTIC ACID: CPT | Performed by: EMERGENCY MEDICINE

## 2024-12-09 PROCEDURE — 81001 URINALYSIS AUTO W/SCOPE: CPT | Performed by: EMERGENCY MEDICINE

## 2024-12-09 PROCEDURE — 80053 COMPREHEN METABOLIC PANEL: CPT

## 2024-12-09 PROCEDURE — 71275 CT ANGIOGRAPHY CHEST: CPT | Performed by: STUDENT IN AN ORGANIZED HEALTH CARE EDUCATION/TRAINING PROGRAM

## 2024-12-09 PROCEDURE — 86850 RBC ANTIBODY SCREEN: CPT

## 2024-12-09 PROCEDURE — 2550000001 HC RX 255 CONTRASTS: Performed by: EMERGENCY MEDICINE

## 2024-12-09 PROCEDURE — 80048 BASIC METABOLIC PNL TOTAL CA: CPT | Performed by: EMERGENCY MEDICINE

## 2024-12-09 PROCEDURE — 84484 ASSAY OF TROPONIN QUANT: CPT | Performed by: EMERGENCY MEDICINE

## 2024-12-09 PROCEDURE — 87636 SARSCOV2 & INF A&B AMP PRB: CPT | Performed by: EMERGENCY MEDICINE

## 2024-12-09 PROCEDURE — 93971 EXTREMITY STUDY: CPT

## 2024-12-09 RX ORDER — ALBUTEROL SULFATE 90 UG/1
2 INHALANT RESPIRATORY (INHALATION) EVERY 6 HOURS PRN
Qty: 18 G | Refills: 0 | Status: SHIPPED | OUTPATIENT
Start: 2024-12-09 | End: 2024-12-16

## 2024-12-09 RX ORDER — LEVOFLOXACIN 5 MG/ML
750 INJECTION, SOLUTION INTRAVENOUS ONCE
Status: COMPLETED | OUTPATIENT
Start: 2024-12-09 | End: 2024-12-09

## 2024-12-09 RX ORDER — LEVOFLOXACIN 500 MG/1
500 TABLET, FILM COATED ORAL DAILY
Qty: 10 TABLET | Refills: 0 | Status: SHIPPED | OUTPATIENT
Start: 2024-12-09 | End: 2024-12-19

## 2024-12-09 RX ORDER — IPRATROPIUM BROMIDE AND ALBUTEROL SULFATE 2.5; .5 MG/3ML; MG/3ML
3 SOLUTION RESPIRATORY (INHALATION) ONCE
Status: COMPLETED | OUTPATIENT
Start: 2024-12-09 | End: 2024-12-09

## 2024-12-09 ASSESSMENT — ENCOUNTER SYMPTOMS
BRUISES/BLEEDS EASILY: 1
ARTHRALGIAS: 0
SINUS PRESSURE: 1
MYALGIAS: 0
NECK PAIN: 0
DIZZINESS: 0
ABDOMINAL PAIN: 0
PALPITATIONS: 0
PSYCHIATRIC NEGATIVE: 1
DYSURIA: 0
NAUSEA: 0
EYES NEGATIVE: 1
SHORTNESS OF BREATH: 1
WHEEZING: 1
FEVER: 0
LIGHT-HEADEDNESS: 0
CHILLS: 0
VOMITING: 0
SINUS PAIN: 1

## 2024-12-09 ASSESSMENT — PAIN - FUNCTIONAL ASSESSMENT: PAIN_FUNCTIONAL_ASSESSMENT: 0-10

## 2024-12-09 ASSESSMENT — COLUMBIA-SUICIDE SEVERITY RATING SCALE - C-SSRS
6. HAVE YOU EVER DONE ANYTHING, STARTED TO DO ANYTHING, OR PREPARED TO DO ANYTHING TO END YOUR LIFE?: NO
1. IN THE PAST MONTH, HAVE YOU WISHED YOU WERE DEAD OR WISHED YOU COULD GO TO SLEEP AND NOT WAKE UP?: NO
2. HAVE YOU ACTUALLY HAD ANY THOUGHTS OF KILLING YOURSELF?: NO

## 2024-12-09 ASSESSMENT — PAIN DESCRIPTION - LOCATION: LOCATION: CHEST

## 2024-12-09 ASSESSMENT — PAIN DESCRIPTION - PAIN TYPE: TYPE: ACUTE PAIN

## 2024-12-09 ASSESSMENT — PAIN SCALES - GENERAL: PAINLEVEL_OUTOF10: 7

## 2024-12-09 NOTE — ED PROVIDER NOTES
Chief Complaint: SOB    This is a 34-year-old female who presents complaining of shortness of breath.  She has a history of a right arm DVT and has been on Lovenox.  Apparently the DVT was thought to be caused by an impingement of the first rib and is scheduled to have that first rib removed within the next 10 days.  As part of her surgical workup she had a ultrasound done of the right extremity and was found to have a subclavian vein and axillary vein thrombosis DVT.  She also complains of shortness of breath she has had a cough with congestion and wheezing she does smoke is here for shortness of breath also.  Denies any fever or chills.           Review of Systems   Constitutional:  Negative for chills and fever.   HENT:  Positive for congestion, sinus pressure and sinus pain.    Eyes: Negative.    Respiratory:  Positive for shortness of breath and wheezing.    Cardiovascular:  Negative for chest pain, palpitations and leg swelling.   Gastrointestinal:  Negative for abdominal pain, nausea and vomiting.   Genitourinary:  Negative for dysuria.   Musculoskeletal:  Negative for arthralgias, myalgias and neck pain.   Skin:  Negative for rash.   Neurological:  Negative for dizziness and light-headedness.   Hematological:  Bruises/bleeds easily.   Psychiatric/Behavioral: Negative.     All other systems reviewed and are negative.       Physical Exam  Vitals and nursing note reviewed.   Constitutional:       General: She is not in acute distress.     Appearance: She is well-developed. She is not ill-appearing.      Comments: Patient's pulse oximeter is 95% on room air she is able speak full sentences she is not toxic at this time   HENT:      Head: Normocephalic and atraumatic.   Eyes:      Pupils: Pupils are equal, round, and reactive to light.   Cardiovascular:      Rate and Rhythm: Normal rate.      Pulses: Normal pulses.   Pulmonary:      Effort: Pulmonary effort is normal.      Breath sounds: Decreased breath sounds,  wheezing and rhonchi present.   Chest:      Chest wall: No mass or deformity.   Abdominal:      Palpations: There is no mass.      Tenderness: There is no abdominal tenderness.   Musculoskeletal:         General: Normal range of motion.      Cervical back: Normal range of motion.      Right lower leg: No tenderness. No edema.      Left lower leg: No tenderness. No edema.   Skin:     General: Skin is warm and dry.      Capillary Refill: Capillary refill takes less than 2 seconds.      Coloration: Skin is not pale.      Findings: No ecchymosis or erythema.   Neurological:      General: No focal deficit present.      Mental Status: She is alert and oriented to person, place, and time.   Psychiatric:         Mood and Affect: Mood normal.          Labs Reviewed   CBC WITH AUTO DIFFERENTIAL - Abnormal       Result Value    WBC 16.2 (*)     nRBC 0.0      RBC 3.79 (*)     Hemoglobin 10.6 (*)     Hematocrit 32.1 (*)     MCV 85      MCH 28.0      MCHC 33.0      RDW 12.9      Platelets 325      Neutrophils % 77.1      Immature Granulocytes %, Automated 0.5      Lymphocytes % 14.1      Monocytes % 5.8      Eosinophils % 2.3      Basophils % 0.2      Neutrophils Absolute 12.50 (*)     Immature Granulocytes Absolute, Automated 0.08      Lymphocytes Absolute 2.29      Monocytes Absolute 0.94      Eosinophils Absolute 0.38      Basophils Absolute 0.03     BASIC METABOLIC PANEL - Abnormal    Glucose 99      Sodium 134 (*)     Potassium 3.8      Chloride 104      Bicarbonate 26      Anion Gap 8 (*)     Urea Nitrogen 15      Creatinine 0.42 (*)     eGFR >90      Calcium 8.4 (*)    D-DIMER, VTE EXCLUSION - Abnormal    D-Dimer, Quantitative VTE Exclusion 651 (*)     Narrative:     The VTE Exclusion D-Dimer assay is reported in ng/mL Fibrinogen Equivalent Units (FEU).    Per 's instructions for use, a value of less than 500 ng/mL (FEU) may help to exclude DVT or PE in outpatients when the assay is used with a clinical pretest  probability assessment.([facility] must utilize and document eCalc 'Wells Score Deep Vein Thrombosis Risk' for DVT exclusion only. Emergency Department should utilize  Guidelines for Emergency Department Use of the VTE Exclusion D-Dimer and Clinical Pretest probability assessment model for DVT or PE exclusion.)   URINALYSIS WITH REFLEX CULTURE AND MICROSCOPIC - Abnormal    Color, Urine Brown (*)     Appearance, Urine Ex.Turbid (*)     Specific Gravity, Urine 1.027      pH, Urine 6.5      Protein, Urine 70 (1+) (*)     Glucose, Urine Normal      Blood, Urine OVER (3+) (*)     Ketones, Urine NEGATIVE      Bilirubin, Urine NEGATIVE      Urobilinogen, Urine Normal      Nitrite, Urine NEGATIVE      Leukocyte Esterase, Urine 250 Meagan/µL (*)     Narrative:     OVER is reported when the result is greater than the clinically reportable range.   MICROSCOPIC ONLY, URINE - Abnormal    WBC, Urine 21-50 (*)     RBC, Urine 6-10 (*)     Squamous Epithelial Cells, Urine 51-75 (2+)      Bacteria, Urine 2+ (*)     Mucus, Urine FEW     MAGNESIUM - Normal    Magnesium 1.77     LIPASE - Normal    Lipase 26      Narrative:     Venipuncture immediately after or during the administration of Metamizole may lead to falsely low results. Testing should be performed immediately prior to Metamizole dosing.   HEPATIC FUNCTION PANEL - Normal    Albumin 3.5      Bilirubin, Total 0.2      Bilirubin, Direct 0.0      Alkaline Phosphatase 65      ALT 26      AST 14      Total Protein 6.5     LACTATE - Normal    Lactate 0.8      Narrative:     Venipuncture immediately after or during the administration of Metamizole may lead to falsely low results. Testing should be performed immediately prior to Metamizole dosing.   TROPONIN I, HIGH SENSITIVITY - Normal    Troponin I, High Sensitivity 4      Narrative:     Less than 99th percentile of normal range cutoff-  Female and children under 18 years old <14 ng/L; Male <21 ng/L: Negative  Repeat testing should be  performed if clinically indicated.     Female and children under 18 years old 14-50 ng/L; Male 21-50 ng/L:  Consistent with possible cardiac damage and possible increased clinical   risk. Serial measurements may help to assess extent of myocardial damage.     >50 ng/L: Consistent with cardiac damage, increased clinical risk and  myocardial infarction. Serial measurements may help assess extent of   myocardial damage.      NOTE: Children less than 1 year old may have higher baseline troponin   levels and results should be interpreted in conjunction with the overall   clinical context.     NOTE: Troponin I testing is performed using a different   testing methodology at [facility] than at other   [facility]. Direct result comparisons should only   be made within the same method.   B-TYPE NATRIURETIC PEPTIDE - Normal    BNP 41      Narrative:        <100 pg/mL - Heart failure unlikely  100-299 pg/mL - Intermediate probability of acute heart                  failure exacerbation. Correlate with clinical                  context and patient history.    >=300 pg/mL - Heart Failure likely. Correlate with clinical                  context and patient history.    BNP testing is performed using different testing methodology at [facility] than at other [facility]. Direct result comparisons should only be made within the same method.      PROTIME-INR - Normal    Protime 11.8      INR 1.0     APTT - Normal    aPTT 34      Narrative:     The APTT is no longer used for monitoring Unfractionated Heparin Therapy. For monitoring Heparin Therapy, use the Heparin Assay.   SARS-COV-2 PCR - Normal    Coronavirus 2019, PCR Not Detected      Narrative:     This assay has received FDA Emergency Use Authorization (EUA) and is only authorized for the duration of time that circumstances exist to justify the authorization of the emergency use of in vitro diagnostic tests for the detection of SARS-CoV-2  virus and/or diagnosis of COVID-19 infection under section 564(b)(1) of the Act, 21 U.S.C. 360bbb-3(b)(1). This assay is an in vitro diagnostic nucleic acid amplification test for the qualitative detection of SARS-CoV-2 from nasopharyngeal specimens and has been validated for use at [facility]. Negative results do not preclude COVID-19 infections and should not be used as the sole basis for diagnosis, treatment, or other management decisions.     INFLUENZA A AND B PCR - Normal    Flu A Result Not Detected      Flu B Result Not Detected      Narrative:     This assay is an in vitro diagnostic multiplex nucleic acid amplification test for the detection and discrimination of Influenza A & B from nasopharyngeal specimens, and has been validated for use at [facility]. Negative results do not preclude Influenza A/B infections, and should not be used as the sole basis for diagnosis, treatment, or other management decisions. If Influenza A/B and RSV PCR results are negative, testing for Parainfluenza virus, Adenovirus and Metapneumovirus is routinely performed for [facility] [place] [place] [facility] inpatients, and is available on other patients by placing an add-on request.   URINE CULTURE   URINALYSIS WITH REFLEX CULTURE AND MICROSCOPIC    Narrative:     The following orders were created for panel order Urinalysis with Reflex Culture and Microscopic.  Procedure                               Abnormality         Status                     ---------                               -----------         ------                     Urinalysis with Reflex C...[[id number]]  Abnormal            Final result               Extra Urine Gray Tube[[id number]]                            In process                   Please view results for these tests on the individual orders.   EXTRA URINE GRAY TUBE        CT angio chest for pulmonary embolism   Final Result   No PE.        Multifocal  pneumonia. Consider follow-up to resolution in 8-12 weeks.        MACRO   None        Signed by: ОлегBriggs Larry 12/9/2024 4:34 PM   Dictation workstation:   [id number]           Procedures     Medical Decision Making  Romansh diagnose include COVID-19 influenza pneumonia asthmatic bronchitis DVT pulmonary embolism.  DuoNeb aerosol treatment IV Hep-Lock cardiac monitoring pulse oximeter were ordered as well as CT scan of the chest at this time and appropriate labs.  Patient's white blood count was 16.2 H&H was 10.6 and 32 at this time.  Influenza AMB and COVID testing were negative troponin was negative BNP was normal metabolic panel was normal D-dimer was positive urinalysis showed a UTI also count was elevated 16.2 with H&H of 10 and 32.  CT of the angio the chest showed no pulmonary embolism but pulm multifocal pneumonia.  Patient received DuoNeb IV Hep-Lock pulse oximeter readings and Levaquin 750 mg IV.  Patient was to be transferred to [facility] for coordination of care with her gastric surgeon and vascular surgeon for thrombectomy and treatment for the multifocal pneumonia however she was adamant that she was not staying in the hospital any further and signed out AGAINST MEDICAL ADVICE she was fully aware that she could set up for sudden cardiac death from a PE she could have also overwhelming infection from the pneumonia and still elected to leave the facility at this time she is advised to call her specialist ASAP tomorrow    Amount and/or Complexity of Data Reviewed  ECG/medicine tests: independent interpretation performed.     Details: Lead EKG showed sinus rhythm at 90/min there is no acute ST segment elevation depressions or arrhythmias as interpreted by myself the emergency physician         Diagnoses as of 12/09/24 1747   DVT of axillary vein, acute right (Multi)   Subclavian vein thrombosis, right   Multifocal pneumonia   Bacterial UTI                    Bulmaro Mendoza MD  12/09/24 3416        Bulmaro Mendoza MD  12/09/24 1557       Bulmaro Mendoza MD  12/09/24 2385

## 2024-12-10 ENCOUNTER — LAB REQUISITION (OUTPATIENT)
Dept: LAB | Facility: HOSPITAL | Age: 34
End: 2024-12-10
Payer: COMMERCIAL

## 2024-12-10 ENCOUNTER — TELEPHONE (OUTPATIENT)
Dept: VASCULAR SURGERY | Facility: HOSPITAL | Age: 34
End: 2024-12-10
Payer: COMMERCIAL

## 2024-12-10 DIAGNOSIS — I82.A11: ICD-10-CM

## 2024-12-10 LAB
ABO GROUP (TYPE) IN BLOOD: NORMAL
ABO GROUP (TYPE) IN BLOOD: NORMAL
ANTIBODY SCREEN: NORMAL
ANTIBODY SCREEN: NORMAL
HOLD SPECIMEN: NORMAL
RH FACTOR (ANTIGEN D): NORMAL
RH FACTOR (ANTIGEN D): NORMAL

## 2024-12-10 NOTE — TELEPHONE ENCOUNTER
Late  entry -   I had received   a message from the vascular  tech at  [facility] ( Patric Lingbraden RT )  stating that the right upper venous  duplex  is complete . It showed  acute occlusive thrombus in the  right  subclavian, axillary and proximal basilic and cephalic.   Per the tech the patient  stated  she was extremely tired, having some SOB with cough.   Following  review  with Dr. Andrews, I have told  Mr Higuera  to send the patient to the ED for  evaluation and possible transfer to [facility]  per Dr. Andrews's  request.   Mr. Higuera had sent the patient to the ED.    VENKATA Fernando

## 2024-12-10 NOTE — TELEPHONE ENCOUNTER
I have had the pleasure of speaking with  Ms. Menendez this morning.   She states  that  she gets very nervous  when ever  she is told  she  has to  stay in a hospital.   Following  review  with  Dr. Andrews, the  planned first rib resection has been cancelled.   Per report the patient has a UTI and  the patient states  she has pneumonia.   She denies having a primary  care provider .  The patient has been scheduled with Ms. Kaelyn Koch CNP  on 12/17/2024 @9:50 AM.  Directions to the office  have been provided.     Ms. Menendez  will contact our office  when she is ready to reschedule an operative procedure .  VENKATA Fernando

## 2024-12-11 LAB — BACTERIA UR CULT: NORMAL

## 2024-12-13 LAB
ATRIAL RATE: 90 BPM
P AXIS: 19 DEGREES
P OFFSET: 202 MS
P ONSET: 156 MS
PR INTERVAL: 138 MS
Q ONSET: 225 MS
QRS COUNT: 15 BEATS
QRS DURATION: 80 MS
QT INTERVAL: 346 MS
QTC CALCULATION(BAZETT): 423 MS
QTC FREDERICIA: 396 MS
R AXIS: -11 DEGREES
T AXIS: 44 DEGREES
T OFFSET: 398 MS
VENTRICULAR RATE: 90 BPM

## 2024-12-17 ENCOUNTER — APPOINTMENT (OUTPATIENT)
Dept: PRIMARY CARE | Facility: CLINIC | Age: 34
End: 2024-12-17
Payer: COMMERCIAL

## 2025-01-02 ENCOUNTER — TELEPHONE (OUTPATIENT)
Dept: VASCULAR SURGERY | Facility: HOSPITAL | Age: 35
End: 2025-01-02
Payer: COMMERCIAL

## 2025-01-02 ENCOUNTER — APPOINTMENT (OUTPATIENT)
Dept: VASCULAR SURGERY | Facility: CLINIC | Age: 35
End: 2025-01-02
Payer: COMMERCIAL

## 2025-01-02 NOTE — TELEPHONE ENCOUNTER
I have attempted to contact   Ms. Menendez     . There is no answer at the following phone number  [phone] . I have left a voice mail message for the patient to contact 's  office nurse at [phone]    I am  returning the patient's  phone call .   Madisyn Ingram RN BSN

## 2025-01-03 ENCOUNTER — TELEPHONE (OUTPATIENT)
Dept: VASCULAR SURGERY | Facility: HOSPITAL | Age: 35
End: 2025-01-03
Payer: COMMERCIAL

## 2025-01-03 NOTE — TELEPHONE ENCOUNTER
I have attempted to contact   Ms Menendez    . There is no answer at the following phone number [phone]    . I have left a voice mail message for the patient to contact Dr. Andrews's  office nurse at [phone]   Per Dr. Andrews., the patient will need an in person evaluation PRIOR to scheduling any surgical procedure.   Madisyn Ingram RN BSN

## 2025-01-06 ENCOUNTER — TELEPHONE (OUTPATIENT)
Dept: VASCULAR SURGERY | Facility: HOSPITAL | Age: 35
End: 2025-01-06
Payer: COMMERCIAL

## 2025-01-06 NOTE — TELEPHONE ENCOUNTER
I have attempted to contact    Ms Menendez     . There is no answer at the following phone number  [phone]   . I have left a voice mail message for the patient to contact Dr. Andrews's  office nurse at [phone]     I am returning the patient's phone call from 1/2/2025.  Madisyn Ingram RN BSN

## 2025-01-07 ENCOUNTER — TELEPHONE (OUTPATIENT)
Dept: VASCULAR SURGERY | Facility: HOSPITAL | Age: 35
End: 2025-01-07
Payer: COMMERCIAL

## 2025-01-07 NOTE — TELEPHONE ENCOUNTER
I have attempted to contact     Ms Menendez  . There is no answer at the following phone number  [phone]    . I have left a voice mail message for the patient to contact 's  office nurse at [phone]    I am returning the patient's  phone call.   Madisyn Ingram RN BSN

## 2025-01-10 ENCOUNTER — LAB (OUTPATIENT)
Dept: LAB | Facility: LAB | Age: 35
End: 2025-01-10
Payer: COMMERCIAL

## 2025-01-10 ENCOUNTER — APPOINTMENT (OUTPATIENT)
Dept: CARDIOLOGY | Facility: CLINIC | Age: 35
End: 2025-01-10
Payer: COMMERCIAL

## 2025-01-10 VITALS
OXYGEN SATURATION: 96 % | WEIGHT: 205.5 LBS | SYSTOLIC BLOOD PRESSURE: 132 MMHG | DIASTOLIC BLOOD PRESSURE: 88 MMHG | BODY MASS INDEX: 40.34 KG/M2 | HEIGHT: 60 IN | HEART RATE: 82 BPM

## 2025-01-10 DIAGNOSIS — H92.01 RIGHT EAR PAIN: ICD-10-CM

## 2025-01-10 DIAGNOSIS — I82.A11 ACUTE DEEP VEIN THROMBOSIS (DVT) OF AXILLARY VEIN OF RIGHT UPPER EXTREMITY (MULTI): ICD-10-CM

## 2025-01-10 DIAGNOSIS — H73.90 DULLNESS OF LIGHT REFLEX OF TYMPANIC MEMBRANE: ICD-10-CM

## 2025-01-10 DIAGNOSIS — R09.89 CHEST CONGESTION: ICD-10-CM

## 2025-01-10 DIAGNOSIS — M79.622 LEFT UPPER ARM PAIN: Primary | ICD-10-CM

## 2025-01-10 LAB — HCG UR QL IA.RAPID: NEGATIVE

## 2025-01-10 PROCEDURE — 99214 OFFICE O/P EST MOD 30 MIN: CPT

## 2025-01-10 PROCEDURE — 81025 URINE PREGNANCY TEST: CPT

## 2025-01-10 PROCEDURE — 3008F BODY MASS INDEX DOCD: CPT

## 2025-01-10 PROCEDURE — 93000 ELECTROCARDIOGRAM COMPLETE: CPT

## 2025-01-10 RX ORDER — ENOXAPARIN SODIUM 100 MG/ML
1 INJECTION SUBCUTANEOUS 2 TIMES DAILY
Qty: 60 EACH | Refills: 1 | Status: SHIPPED | OUTPATIENT
Start: 2025-01-10 | End: 2025-03-11

## 2025-01-10 RX ORDER — ENOXAPARIN SODIUM 100 MG/ML
1 INJECTION SUBCUTANEOUS 2 TIMES DAILY
Qty: 60 EACH | Refills: 1 | Status: SHIPPED | OUTPATIENT
Start: 2025-01-10 | End: 2025-01-10 | Stop reason: SDUPTHER

## 2025-01-10 NOTE — PROGRESS NOTES
CHIEF COMPLAINT  Follow up     HISTORY OF PRESENT ILLNESS    Vi Menendez is a 34 y.o. year old female patient with no known prior medical history who is presenting for follow-up after recent hospital admission with unprovoked right upper extremity DVT status post thrombectomy. Patient was discharged on lovenox injections. She was seen by Dr. Andrews on 12/5/24 who ordered a RUE duplex to check for vein patency and was scheduled for right first rib resection on 12/16/24 with intraoperative venogram.   On 12/9/24, patient presented the ER with complaints of shortness of breath. Her WBC was 16.2, COVID and flu were negative. CTA of the chest showed no PE but did show multifocal pneumonia. RUE duplex showing acute thrombus remains to right subclavian, axillary, basilic, and cephalic veins and also showed SVT to right prox basilic and cephalic. Patient was then to be transferred to [facility] for coordination with her gastric surgeon and vascular surgeon for thrombectomy and tx of the pneumonia. Patient signed out AMA from the ER. Dr. Andrews's office has reached out numerous times to patient with no response.     Currently, patient reports cough with clear sputum, congestion, right ear pain and fullness, and left upper arm pain. She was treated with Levaquin 500mg p.o. x 5 days after her ER visit and reports compliance. She went a period without a phone which explains why Dr. Andrews's office was unable to reach her. She reports she did get a phone today and will call their office next week.       Past Medical, Surgical, and Family History reviewed and updated in chart.     Reviewed all medications by prescribing practitioner or clinical pharmacist (such as prescriptions, OTCs, herbal therapies and supplements) and documented in the medical record.    Past Medical History  Past Medical History:   Diagnosis Date    Broken teeth     in back    Depression     DVT of axillary vein, acute right (Multi)     Hepatitis C   "   hx of per pt    Nephrolithiasis     Wears glasses        Social History  Social History     Tobacco Use    Smoking status: Every Day     Current packs/day: 1.00     Average packs/day: 1 pack/day for 10.3 years (10.3 ttl pk-yrs)     Types: Cigarettes     Start date: 10/2014    Smokeless tobacco: Never   Vaping Use    Vaping status: Never Used   Substance Use Topics    Alcohol use: Not Currently    Drug use: Never       Family History     Family History   Problem Relation Name Age of Onset    Stroke Maternal Grandfather      Heart attack Maternal Grandfather          Allergies:  Allergies   Allergen Reactions    Penicillin Swelling     tongue swelling        Outpatient Medications:  Current Outpatient Medications   Medication Instructions    albuterol 90 mcg/actuation inhaler 2 puffs, inhalation, Every 6 hours PRN, DISPENSE ONE INHALER    enoxaparin (LOVENOX) 1 mg/kg, subcutaneous, 2 times daily          Labs:   CMP:  Recent Labs     12/09/24  1536 12/09/24  0855 10/29/24  1649   * 137 138   K 3.8 4.2 3.7    106 107   CO2 26 26 28   ANIONGAP 8* 9* 7*   BUN 15 20 16   CREATININE 0.42* 0.49* 0.62   EGFR >90 >90 >90   MG 1.77  --   --      Recent Labs     12/09/24  1536 10/29/24  1649   ALBUMIN 3.5 3.7   ALKPHOS 65 62   ALT 26 36   AST 14 18   BILITOT 0.2 0.2   LIPASE 26  --      CBC:  Recent Labs     12/09/24  1536 12/09/24  0855 10/29/24  1649   WBC 16.2* 14.2* 11.2   HGB 10.6* 11.3* 11.2*   HCT 32.1* 35.8* 34.4*    370 251   MCV 85 86 85     COAG:   Recent Labs     12/09/24  1536 10/30/24  0657 10/30/24  0250 10/29/24  2137 10/29/24  1649   INR 1.0  --   --   --  0.9   HAUF  --  0.5 0.5 0.8  --    DDIMERVTE 651*  --   --   --   --      ABO:   Recent Labs     12/09/24  0855   ABO B  B     HEME/ENDO:No results for input(s): \"FERRITIN\", \"IRONSAT\", \"TSH\", \"HGBA1C\" in the last 79449 hours.   CARDIAC:   Recent Labs     12/09/24  1536 10/29/24  2305   TROPHS 4 <3   BNP 41  --    No results for " "input(s): \"CHOL\", \"LDLF\", \"HDL\", \"TRIG\" in the last 10082 hours.  MICRO: No results for input(s): \"ESR\", \"CRP\", \"PROCAL\" in the last 54544 hours.  No results found for the last 90 days.    Notable Studies: imaging personally reviewed   EKG:  Encounter Date: 12/09/24   ECG 12 lead   Result Value    Ventricular Rate 90    Atrial Rate 90    CT Interval 138    QRS Duration 80    QT Interval 346    QTC Calculation(Bazett) 423    P Axis 19    R Axis -11    T Axis 44    QRS Count 15    Q Onset 225    P Onset 156    P Offset 202    T Offset 398    QTC Fredericia 396    Narrative    Normal sinus rhythm  Normal ECG  When compared with ECG of 29-OCT-2024 23:06,  No significant change was found  See ED provider note for full interpretation and clinical correlation  Confirmed by Milena Taylor ([id number]) on 12/13/2024 1:25:36 PM     Echocardiogram: No results found for this or any previous visit from the past 1825 days.    Stress Testing: No results found for this or any previous visit from the past 1825 days.    Cardiac Catheterization: No results found for this or any previous visit from the past 1825 days.  Venogram 10/30/2024    [facility]  [address] [place] [address]  Phone [phone], Fax [phone]    Cardiovascular Catheterization Report    Patient Name:      KALPESH TIERNEY        Performing Physician:  [id number] Ambriz MD  Study Date:        10/30/2024            Verifying Physician:   Nadeen Ambriz MD  MRN/PID:           [id number]              Cardiologist/Co-Scrub:  Accession#:        [id number]          Ordering Provider:     Nadeen QIU  Date of Birth/Age: 1990 / 33 years Cardiologist:  Gender:            F                     Fellow:  Encounter#:        [id number]            Surgeon:      Study:            Venogram  Additional Study: Venous Intervention      Indications:  KALPESH TIERNEY is a 34 year " old female with history of smoking who presents with acute onset right upper extremity swelling, pain, and discoloration, found to have extensive RUE DVT without provoking factors, likely from thoracic outlet syndrome.    Procedure Description:  After infiltration of local anesthetic, the right basilic vein vein was identified with two dimensional ultrasound. Under direct ultrasound visualization, the right basilic vein vein was cannulated with a micropuncture technique. A 8 Frisian sheath was placed in the vein. Post-procedure, the venous sheath was pulled and pressure was applied to the site. After obtaning right basilic vein access we obtained diagnostic venogram that showed thrombotic occlusion of the right innominate, subclavian, and axillary veins.    Next, we advanced an 035 navicross and 035 stiff angled glide wire and were able to cross easily across the thrombotic occlusion. Contrast injection in the SVC confirmed we were true lumen. We then exchanged the wire in the distal IVC for an 035 Amplatz wire. Next, we performed IVUS that showed the areas of thrombotic occlusion and that we were true lumen.    We then upsized to the 13Fr ClotTriever sheath. We then performed thrombectomy using the ClotTriever device and performed three passes retreiving moderate amounts of thrombus. We performed IVUS again that showed significant clearance of the thrombus and an area of compression in the subclavian/innominate vein juction. We then performed venoplasty using an 8x40mm ballon at this location with improvement in the area of stenosis.  Repeat venogram showed patent axillary, subclavian and innominate veins with brisk flow to the SVC.    Hemo Personnel:  +----------------------------+---------+  Name                        Duty       +----------------------------+---------+  Pepe Ambriz MD 1  +----------------------------+---------+      Complications:  No in-lab complications  observed.    Cardiac Cath Post Procedure Notes:  Post Procedure Diagnosis: RUE DVT.  Blood Loss:               Estimated blood loss during the procedure was 10 mls.  Specimens Removed:        Number of specimen(s) removed: none.      Recommendations:  Refrral to vascular surgery for thoracic outlet syndrome evaluation and  potential first rib resection.    ____________________________________________________________________________________  CONCLUSIONS:  1. Indication: Extensive RUE DVT.  2. Ultrasound guided basilic vein access.  3. Right upper extremity venogram.  4. Thrombectomy of right upper extremity DVT using ClotTreiver device.  5. IVUS of right innominate/subclavian/axillary veins.  6. Venoplasty of right innominate/subclavian veins.    ICD 10 Codes:  Acute embolism and thrombosis of deep veins of right up extrem-I82.621    CPT Codes:  Venography extremity unilat S&I-83637; Extirpation of material, unilateral vein-06190; IVUS non coronary initial vessel-64734; Moderate Sedation Services initial 15 minutes patient >5 years-46860; Moderate Sedation Services 1st additional 15 minutes patient >5 years-36983; Moderate Sedation Services 2nd additional 15 minutes patient >5 years-09275; Moderate Sedation Services 3rd additional 15 minutes patient >5 years-09041; Angioplasty, perc,Venous initial vein (PER)-40800    [id number] Pepe Ambriz MD  Performing Physician  Electronically signed by [id number] Pepe Ambriz MD on 10/31/2024 at  9:04:18 PM          ** Final **         REVIEW OF SYSTEMS  A 10-point system review was completed and was negative except as noted in the HPI.      VITALS  Vitals:    01/10/25 1351   BP: 132/88   Pulse: 82   SpO2: 96%       PHYSICAL EXAM  General: awake, alert and oriented. No acute distress.   Skin: Skin is warm, dry and intact without rashes or lesions.  HEENT: normocephalic, atraumatic; conjunctivae are clear without exudates or hemorrhage. Sclera is non-icteric.  Eyelids are normal in appearance without swelling or lesions. Hearing intact. Nares are patent bilaterally. Moist mucous membranes. Right ear is red with dull light reflex  Extremities: prominent veins to right arm  Neurological: no focal deficits; gait steady  Psychiatric: appropriate mood and affect; good judgment and insight          ASSESSMENT AND PLAN  Assessment/Plan   Diagnoses and all orders for this visit:  Left upper arm pain  -Will order LUE duplex to rule out DVT    Acute deep vein thrombosis (DVT) of axillary vein of right upper extremity (Multi)  -Continue lovenox as prescribed    Right ear pain  Chest congestion  Dullness of light reflex of tympanic membrane  -Will order 2-view chest x-ray  -Will order pregnancy test to help guide antibiotic therapy (if warranted)  -Discussed conservative treatment with humidification, increased fluids, sleeping with HOB elevated, smoking cessation, Tylenol as needed    I did communicate to Dr. Andrews's nurse that patient will be calling their office next week to set up appointment          RTC: after procedure with Dr. Andrews      Thank you for allowing me to participate in the care of this patient. Please reach me out if you have any questions or if you need any clarifications regarding the patient's care.    Tawanna Hernandez, ANAI, APRN, FNP-C  Division of Cardiovascular Medicine  [facility]  [facility]

## 2025-01-13 ENCOUNTER — TELEPHONE (OUTPATIENT)
Dept: VASCULAR SURGERY | Facility: HOSPITAL | Age: 35
End: 2025-01-13
Payer: COMMERCIAL

## 2025-01-13 NOTE — TELEPHONE ENCOUNTER
I have attempted to contact  Ms Menendez    . There is no answer at the following phone number  [phone] or [phone]   . I have left a voice mail message for the patient to contact Dr. Andrews's  office nurse at [phone]   Per Dr. Andrews the patient need to be seen prior to scheduling any surgical procedure.   Madisyn Ingram RN BSN

## 2025-01-14 ENCOUNTER — HOSPITAL ENCOUNTER (OUTPATIENT)
Dept: VASCULAR MEDICINE | Facility: HOSPITAL | Age: 35
Discharge: HOME | End: 2025-01-14
Payer: COMMERCIAL

## 2025-01-14 DIAGNOSIS — I82.A11 ACUTE DEEP VEIN THROMBOSIS (DVT) OF AXILLARY VEIN OF RIGHT UPPER EXTREMITY (MULTI): ICD-10-CM

## 2025-01-14 DIAGNOSIS — M79.602 PAIN IN LEFT ARM: ICD-10-CM

## 2025-01-14 DIAGNOSIS — M79.622 LEFT UPPER ARM PAIN: ICD-10-CM

## 2025-01-14 PROCEDURE — 93971 EXTREMITY STUDY: CPT

## 2025-01-14 PROCEDURE — 93971 EXTREMITY STUDY: CPT | Performed by: SURGERY

## 2025-01-17 ENCOUNTER — TELEPHONE (OUTPATIENT)
Dept: CARDIOLOGY | Facility: CLINIC | Age: 35
End: 2025-01-17
Payer: COMMERCIAL

## 2025-01-17 NOTE — TELEPHONE ENCOUNTER
PT NOTIFIED      ----- Message from Alicia ROJAS sent at 1/16/2025  2:11 PM EST -----  LMTRC  ----- Message -----  From: BETTY Forrester-CNP, DNP  Sent: 1/16/2025  10:53 AM EST  To: [id number] Card[facility] Clinical Support Staff    Please let Vi know her ultrasound shows no DVT of the left upper arm and DVT remains in right upper arm

## 2025-01-20 ENCOUNTER — TELEPHONE (OUTPATIENT)
Dept: VASCULAR SURGERY | Facility: HOSPITAL | Age: 35
End: 2025-01-20
Payer: COMMERCIAL

## 2025-01-20 NOTE — TELEPHONE ENCOUNTER
I have attempted to contact    Ms. Menendez   . There is no answer at the following phone number [phone]     . I have left a voice mail message for the patient to contact Dr. Andrews's  office nurse at [phone]      Per Dr Andrews, the patient will need an office visit  prior to scheduling any surgical procedure. .  Madisyn Ingram RN BSN

## 2025-01-24 ENCOUNTER — TELEPHONE (OUTPATIENT)
Dept: VASCULAR SURGERY | Facility: HOSPITAL | Age: 35
End: 2025-01-24
Payer: COMMERCIAL

## 2025-01-24 NOTE — TELEPHONE ENCOUNTER
I have attempted to contact   Ms. Menendez   . There is no answer at the following phone number    [phone]    . I have left a voice mail message for the patient to contact Dr. Andrews's  office nurse at [phone]   Per Sherrill Andrews  the patient will need an evaluation in  clinic  before  scheduling any surgical procedures.   Madisyn Ingram RN BSN

## 2025-01-29 ENCOUNTER — TELEPHONE (OUTPATIENT)
Dept: VASCULAR SURGERY | Facility: HOSPITAL | Age: 35
End: 2025-01-29
Payer: COMMERCIAL

## 2025-01-29 NOTE — TELEPHONE ENCOUNTER
I have attempted to contact    Ms Menendez     . There is no answer at the following phone number  [phone] or  [phone]  . I have left a voice mail message for the patient to contact Dr. Andrews's  office nurse at [phone]     Per Dr. Andrews, the patient is to schedule an office visit PRIOR to any surgical procedure.       Received a call from [facility]    Authorization for  first rib resection Number [id number]   I have asked the caller to PLEASE  FAX  this information to our office,  FAX number provided .  Madisyn Ingram RN BSN

## 2025-02-24 ENCOUNTER — HOSPITAL ENCOUNTER (EMERGENCY)
Facility: HOSPITAL | Age: 35
End: 2025-02-24
Payer: COMMERCIAL

## 2025-03-07 ENCOUNTER — TELEPHONE (OUTPATIENT)
Dept: CARDIOLOGY | Facility: CLINIC | Age: 35
End: 2025-03-07
Payer: COMMERCIAL

## 2025-03-07 ENCOUNTER — TELEPHONE (OUTPATIENT)
Dept: VASCULAR SURGERY | Facility: HOSPITAL | Age: 35
End: 2025-03-07
Payer: COMMERCIAL

## 2025-03-07 NOTE — TELEPHONE ENCOUNTER
I have attempted to contact   Ms. Vi Menendez    . There is no answer at the following phone number  [phone]    . I have left a voice mail message for the patient to contact Dr. Andrews office nurse at [phone] .    I am returning the patient's  phone call   I have called multiple times. .  Madisyn Ingram RN BSN

## 2025-03-07 NOTE — TELEPHONE ENCOUNTER
Vi left a message 3/6 @ 12:56 pm.      I attempted to call back the number she left at least two times.   [phone]  left one message    I also looked at her chart for another phone number and there was a cell # there and I tried that number as well.

## 2025-05-13 ENCOUNTER — HOSPITAL ENCOUNTER (EMERGENCY)
Age: 35
LOS: 1 days | Discharge: HOME | End: 2025-05-14
Payer: MEDICAID

## 2025-05-13 VITALS
OXYGEN SATURATION: 99 % | DIASTOLIC BLOOD PRESSURE: 103 MMHG | HEART RATE: 106 BPM | RESPIRATION RATE: 26 BRPM | SYSTOLIC BLOOD PRESSURE: 150 MMHG | BODY MASS INDEX: 42.9 KG/M2 | TEMPERATURE: 98.6 F

## 2025-05-13 VITALS
RESPIRATION RATE: 18 BRPM | SYSTOLIC BLOOD PRESSURE: 125 MMHG | OXYGEN SATURATION: 96 % | HEART RATE: 105 BPM | TEMPERATURE: 98.6 F | DIASTOLIC BLOOD PRESSURE: 105 MMHG

## 2025-05-13 DIAGNOSIS — L03.114: Primary | ICD-10-CM

## 2025-05-13 DIAGNOSIS — F17.210: ICD-10-CM

## 2025-05-13 DIAGNOSIS — I10: ICD-10-CM

## 2025-05-13 DIAGNOSIS — Z90.49: ICD-10-CM

## 2025-05-13 DIAGNOSIS — M79.632: ICD-10-CM

## 2025-05-13 PROCEDURE — 99283 EMERGENCY DEPT VISIT LOW MDM: CPT

## 2025-05-13 PROCEDURE — A4216 STERILE WATER/SALINE, 10 ML: HCPCS

## 2025-05-14 VITALS
DIASTOLIC BLOOD PRESSURE: 96 MMHG | OXYGEN SATURATION: 97 % | RESPIRATION RATE: 18 BRPM | SYSTOLIC BLOOD PRESSURE: 137 MMHG | HEART RATE: 99 BPM | TEMPERATURE: 98.6 F

## 2025-05-19 ENCOUNTER — TELEPHONE (OUTPATIENT)
Dept: VASCULAR SURGERY | Facility: HOSPITAL | Age: 35
End: 2025-05-19
Payer: COMMERCIAL
